# Patient Record
Sex: MALE | Race: WHITE
[De-identification: names, ages, dates, MRNs, and addresses within clinical notes are randomized per-mention and may not be internally consistent; named-entity substitution may affect disease eponyms.]

---

## 2019-06-19 ENCOUNTER — HOSPITAL ENCOUNTER (OUTPATIENT)
Dept: HOSPITAL 8 - CFH | Age: 59
Discharge: HOME | End: 2019-06-19
Attending: INTERNAL MEDICINE
Payer: MEDICAID

## 2019-06-19 DIAGNOSIS — R16.1: ICD-10-CM

## 2019-06-19 DIAGNOSIS — N28.1: ICD-10-CM

## 2019-06-19 DIAGNOSIS — D18.09: ICD-10-CM

## 2019-06-19 DIAGNOSIS — K82.4: Primary | ICD-10-CM

## 2019-06-19 PROCEDURE — 76700 US EXAM ABDOM COMPLETE: CPT

## 2019-07-26 ENCOUNTER — HOSPITAL ENCOUNTER (OUTPATIENT)
Dept: LAB | Facility: MEDICAL CENTER | Age: 59
End: 2019-07-26
Attending: INTERNAL MEDICINE
Payer: MEDICAID

## 2019-07-26 LAB
ALBUMIN SERPL BCP-MCNC: 4.1 G/DL (ref 3.2–4.9)
ALBUMIN/GLOB SERPL: 0.9 G/DL
ALP SERPL-CCNC: 83 U/L (ref 30–99)
ALT SERPL-CCNC: 29 U/L (ref 2–50)
ANION GAP SERPL CALC-SCNC: 8 MMOL/L (ref 0–11.9)
AST SERPL-CCNC: 35 U/L (ref 12–45)
BILIRUB SERPL-MCNC: 0.5 MG/DL (ref 0.1–1.5)
BUN SERPL-MCNC: 50 MG/DL (ref 8–22)
CALCIUM SERPL-MCNC: 9.8 MG/DL (ref 8.5–10.5)
CHLORIDE SERPL-SCNC: 98 MMOL/L (ref 96–112)
CO2 SERPL-SCNC: 28 MMOL/L (ref 20–33)
CREAT SERPL-MCNC: 2.06 MG/DL (ref 0.5–1.4)
GLOBULIN SER CALC-MCNC: 4.8 G/DL (ref 1.9–3.5)
GLUCOSE SERPL-MCNC: 100 MG/DL (ref 65–99)
POTASSIUM SERPL-SCNC: 5.1 MMOL/L (ref 3.6–5.5)
PROT SERPL-MCNC: 8.9 G/DL (ref 6–8.2)
SODIUM SERPL-SCNC: 134 MMOL/L (ref 135–145)

## 2019-07-26 PROCEDURE — 36415 COLL VENOUS BLD VENIPUNCTURE: CPT

## 2019-07-26 PROCEDURE — 80053 COMPREHEN METABOLIC PANEL: CPT

## 2019-07-26 PROCEDURE — 87902 NFCT AGT GNTYP ALYS HEP C: CPT

## 2019-07-30 LAB — HCV GENTYP SERPL NAA+PROBE: NORMAL

## 2020-03-12 ENCOUNTER — HOSPITAL ENCOUNTER (OUTPATIENT)
Dept: LAB | Facility: MEDICAL CENTER | Age: 60
End: 2020-03-12
Attending: INTERNAL MEDICINE
Payer: MEDICAID

## 2020-03-12 LAB
BASOPHILS # BLD AUTO: 0.6 % (ref 0–1.8)
BASOPHILS # BLD: 0.04 K/UL (ref 0–0.12)
C3 SERPL-MCNC: 146 MG/DL (ref 87–200)
C4 SERPL-MCNC: 28 MG/DL (ref 19–52)
EOSINOPHIL # BLD AUTO: 0.17 K/UL (ref 0–0.51)
EOSINOPHIL NFR BLD: 2.3 % (ref 0–6.9)
ERYTHROCYTE [DISTWIDTH] IN BLOOD BY AUTOMATED COUNT: 42.9 FL (ref 35.9–50)
ERYTHROCYTE [SEDIMENTATION RATE] IN BLOOD BY WESTERGREN METHOD: 58 MM/HOUR (ref 0–20)
HCT VFR BLD AUTO: 38.1 % (ref 42–52)
HGB BLD-MCNC: 13.4 G/DL (ref 14–18)
IMM GRANULOCYTES # BLD AUTO: 0.03 K/UL (ref 0–0.11)
IMM GRANULOCYTES NFR BLD AUTO: 0.4 % (ref 0–0.9)
LYMPHOCYTES # BLD AUTO: 1.87 K/UL (ref 1–4.8)
LYMPHOCYTES NFR BLD: 25.8 % (ref 22–41)
MCH RBC QN AUTO: 30.7 PG (ref 27–33)
MCHC RBC AUTO-ENTMCNC: 35.2 G/DL (ref 33.7–35.3)
MCV RBC AUTO: 87.2 FL (ref 81.4–97.8)
MONOCYTES # BLD AUTO: 0.82 K/UL (ref 0–0.85)
MONOCYTES NFR BLD AUTO: 11.3 % (ref 0–13.4)
NEUTROPHILS # BLD AUTO: 4.31 K/UL (ref 1.82–7.42)
NEUTROPHILS NFR BLD: 59.6 % (ref 44–72)
NRBC # BLD AUTO: 0 K/UL
NRBC BLD-RTO: 0 /100 WBC
PLATELET # BLD AUTO: 260 K/UL (ref 164–446)
PMV BLD AUTO: 9.4 FL (ref 9–12.9)
RBC # BLD AUTO: 4.37 M/UL (ref 4.7–6.1)
WBC # BLD AUTO: 7.2 K/UL (ref 4.8–10.8)

## 2020-03-12 PROCEDURE — 83970 ASSAY OF PARATHORMONE: CPT

## 2020-03-12 PROCEDURE — 84550 ASSAY OF BLOOD/URIC ACID: CPT

## 2020-03-12 PROCEDURE — 83550 IRON BINDING TEST: CPT

## 2020-03-12 PROCEDURE — 86162 COMPLEMENT TOTAL (CH50): CPT

## 2020-03-12 PROCEDURE — 83036 HEMOGLOBIN GLYCOSYLATED A1C: CPT

## 2020-03-12 PROCEDURE — 83540 ASSAY OF IRON: CPT

## 2020-03-12 PROCEDURE — 85025 COMPLETE CBC W/AUTO DIFF WBC: CPT

## 2020-03-12 PROCEDURE — 82728 ASSAY OF FERRITIN: CPT

## 2020-03-12 PROCEDURE — 82306 VITAMIN D 25 HYDROXY: CPT

## 2020-03-12 PROCEDURE — 80069 RENAL FUNCTION PANEL: CPT

## 2020-03-12 PROCEDURE — 86160 COMPLEMENT ANTIGEN: CPT

## 2020-03-12 PROCEDURE — 85652 RBC SED RATE AUTOMATED: CPT

## 2020-03-12 PROCEDURE — 36415 COLL VENOUS BLD VENIPUNCTURE: CPT

## 2020-03-13 LAB
25(OH)D3 SERPL-MCNC: 63 NG/ML (ref 30–100)
ALBUMIN SERPL BCP-MCNC: 3.8 G/DL (ref 3.2–4.9)
BUN SERPL-MCNC: 50 MG/DL (ref 8–22)
CALCIUM SERPL-MCNC: 9.8 MG/DL (ref 8.5–10.5)
CHLORIDE SERPL-SCNC: 102 MMOL/L (ref 96–112)
CO2 SERPL-SCNC: 22 MMOL/L (ref 20–33)
CREAT SERPL-MCNC: 1.84 MG/DL (ref 0.5–1.4)
EST. AVERAGE GLUCOSE BLD GHB EST-MCNC: 126 MG/DL
FERRITIN SERPL-MCNC: 88.7 NG/ML (ref 22–322)
GLUCOSE SERPL-MCNC: 108 MG/DL (ref 65–99)
HBA1C MFR BLD: 6 % (ref 0–5.6)
IRON SATN MFR SERPL: 15 % (ref 15–55)
IRON SERPL-MCNC: 61 UG/DL (ref 50–180)
PHOSPHATE SERPL-MCNC: 4.5 MG/DL (ref 2.5–4.5)
POTASSIUM SERPL-SCNC: 4.7 MMOL/L (ref 3.6–5.5)
PTH-INTACT SERPL-MCNC: 87.7 PG/ML (ref 14–72)
SODIUM SERPL-SCNC: 135 MMOL/L (ref 135–145)
TIBC SERPL-MCNC: 413 UG/DL (ref 250–450)
URATE SERPL-MCNC: 8.9 MG/DL (ref 2.5–8.3)

## 2020-03-14 LAB — CH50 SERPL-ACNC: 135 CAE UNITS (ref 60–144)

## 2020-04-20 ENCOUNTER — HOSPITAL ENCOUNTER (OUTPATIENT)
Dept: LAB | Facility: MEDICAL CENTER | Age: 60
End: 2020-04-20
Attending: PHYSICIAN ASSISTANT
Payer: MEDICAID

## 2020-04-20 LAB
ALBUMIN SERPL BCP-MCNC: 4 G/DL (ref 3.2–4.9)
ALP SERPL-CCNC: 88 U/L (ref 30–99)
ALT SERPL-CCNC: 28 U/L (ref 2–50)
AST SERPL-CCNC: 37 U/L (ref 12–45)
BILIRUB CONJ SERPL-MCNC: <0.2 MG/DL (ref 0.1–0.5)
BILIRUB INDIRECT SERPL-MCNC: ABNORMAL MG/DL (ref 0–1)
BILIRUB SERPL-MCNC: 0.5 MG/DL (ref 0.1–1.5)
HBV CORE AB SERPL QL IA: NONREACTIVE
HBV SURFACE AB SERPL IA-ACNC: 4.41 MIU/ML (ref 0–10)
HBV SURFACE AG SER QL: NORMAL
PLATELET # BLD AUTO: 192 K/UL (ref 164–446)
PROT SERPL-MCNC: 8.3 G/DL (ref 6–8.2)

## 2020-04-20 PROCEDURE — 82977 ASSAY OF GGT: CPT

## 2020-04-20 PROCEDURE — 86704 HEP B CORE ANTIBODY TOTAL: CPT

## 2020-04-20 PROCEDURE — 86706 HEP B SURFACE ANTIBODY: CPT

## 2020-04-20 PROCEDURE — 84460 ALANINE AMINO (ALT) (SGPT): CPT | Mod: XU

## 2020-04-20 PROCEDURE — 85049 AUTOMATED PLATELET COUNT: CPT

## 2020-04-20 PROCEDURE — 87340 HEPATITIS B SURFACE AG IA: CPT

## 2020-04-20 PROCEDURE — 84450 TRANSFERASE (AST) (SGOT): CPT | Mod: XU

## 2020-04-20 PROCEDURE — 87522 HEPATITIS C REVRS TRNSCRPJ: CPT

## 2020-04-20 PROCEDURE — 84520 ASSAY OF UREA NITROGEN: CPT

## 2020-04-20 PROCEDURE — 80076 HEPATIC FUNCTION PANEL: CPT

## 2020-04-20 PROCEDURE — 36415 COLL VENOUS BLD VENIPUNCTURE: CPT

## 2020-04-20 PROCEDURE — 86708 HEPATITIS A ANTIBODY: CPT

## 2020-04-20 PROCEDURE — 83883 ASSAY NEPHELOMETRY NOT SPEC: CPT

## 2020-04-22 LAB — HAV AB SER QL IA: POSITIVE

## 2020-04-23 LAB
HCV RNA SERPL NAA+PROBE-ACNC: ABNORMAL K[IU]/ML
HCV RNA SERPL NAA+PROBE-LOG IU: 4.99 LOG IU/ML
HCV RNA SERPL QL NAA+PROBE: DETECTED

## 2020-04-24 LAB
A2 MACROGLOB SERPL-MCNC: 278 MG/DL (ref 131–293)
ALT SERPL-CCNC: 33 U/L (ref 5–50)
ANNOTATION COMMENT IMP: ABNORMAL
AST SERPL-CCNC: 41 U/L (ref 9–50)
BUN SERPL-SCNC: 63 MG/DL (ref 7–20)
CIRRHOMETER PT SCORE Q4850: 0.11
FIBROSIS STAGE SERPL QL: ABNORMAL
GGT SERPL-CCNC: 20 U/L (ref 7–51)
INFLAMETER META CLASS Q4853: ABNORMAL
INFLAMETER PT SCORE Q4852: 0.44
LIVER FIBR SCORE SERPL CALC.FIBROMETER: 0.68
PATHOLOGY STUDY: ABNORMAL
PROTHROM ACT/NOR PPP: 90 % (ref 90–120)

## 2020-07-10 ENCOUNTER — HOSPITAL ENCOUNTER (OUTPATIENT)
Dept: LAB | Facility: MEDICAL CENTER | Age: 60
End: 2020-07-10
Attending: INTERNAL MEDICINE
Payer: MEDICAID

## 2020-07-10 LAB
ALBUMIN SERPL BCP-MCNC: 4 G/DL (ref 3.2–4.9)
ANION GAP SERPL CALC-SCNC: 15 MMOL/L (ref 7–16)
APPEARANCE UR: CLEAR
BASOPHILS # BLD AUTO: 0.8 % (ref 0–1.8)
BASOPHILS # BLD: 0.05 K/UL (ref 0–0.12)
BILIRUB UR QL STRIP.AUTO: NEGATIVE
BUN SERPL-MCNC: 41 MG/DL (ref 8–22)
BUN SERPL-MCNC: 41 MG/DL (ref 8–22)
CALCIUM SERPL-MCNC: 9.7 MG/DL (ref 8.5–10.5)
CALCIUM SERPL-MCNC: 9.8 MG/DL (ref 8.5–10.5)
CHLORIDE SERPL-SCNC: 95 MMOL/L (ref 96–112)
CHLORIDE SERPL-SCNC: 96 MMOL/L (ref 96–112)
CO2 SERPL-SCNC: 20 MMOL/L (ref 20–33)
CO2 SERPL-SCNC: 23 MMOL/L (ref 20–33)
COLOR UR: YELLOW
CREAT SERPL-MCNC: 1.82 MG/DL (ref 0.5–1.4)
CREAT SERPL-MCNC: 1.83 MG/DL (ref 0.5–1.4)
CREAT UR-MCNC: 121.5 MG/DL
CREAT UR-MCNC: 122.78 MG/DL
EOSINOPHIL # BLD AUTO: 0.15 K/UL (ref 0–0.51)
EOSINOPHIL NFR BLD: 2.3 % (ref 0–6.9)
ERYTHROCYTE [DISTWIDTH] IN BLOOD BY AUTOMATED COUNT: 45.1 FL (ref 35.9–50)
GLUCOSE SERPL-MCNC: 100 MG/DL (ref 65–99)
GLUCOSE SERPL-MCNC: 98 MG/DL (ref 65–99)
GLUCOSE UR STRIP.AUTO-MCNC: NEGATIVE MG/DL
HCT VFR BLD AUTO: 43.7 % (ref 42–52)
HGB BLD-MCNC: 14.3 G/DL (ref 14–18)
IMM GRANULOCYTES # BLD AUTO: 0.02 K/UL (ref 0–0.11)
IMM GRANULOCYTES NFR BLD AUTO: 0.3 % (ref 0–0.9)
KETONES UR STRIP.AUTO-MCNC: NEGATIVE MG/DL
LEUKOCYTE ESTERASE UR QL STRIP.AUTO: NEGATIVE
LYMPHOCYTES # BLD AUTO: 1.62 K/UL (ref 1–4.8)
LYMPHOCYTES NFR BLD: 25 % (ref 22–41)
MCH RBC QN AUTO: 30 PG (ref 27–33)
MCHC RBC AUTO-ENTMCNC: 32.7 G/DL (ref 33.7–35.3)
MCV RBC AUTO: 91.8 FL (ref 81.4–97.8)
MICRO URNS: NORMAL
MICROALBUMIN UR-MCNC: <1.2 MG/DL
MICROALBUMIN/CREAT UR: NORMAL MG/G (ref 0–30)
MONOCYTES # BLD AUTO: 0.54 K/UL (ref 0–0.85)
MONOCYTES NFR BLD AUTO: 8.3 % (ref 0–13.4)
NEUTROPHILS # BLD AUTO: 4.1 K/UL (ref 1.82–7.42)
NEUTROPHILS NFR BLD: 63.3 % (ref 44–72)
NITRITE UR QL STRIP.AUTO: NEGATIVE
NRBC # BLD AUTO: 0 K/UL
NRBC BLD-RTO: 0 /100 WBC
PH UR STRIP.AUTO: 6 [PH] (ref 5–8)
PHOSPHATE SERPL-MCNC: 3.5 MG/DL (ref 2.5–4.5)
PLATELET # BLD AUTO: 219 K/UL (ref 164–446)
PMV BLD AUTO: 9.2 FL (ref 9–12.9)
POTASSIUM SERPL-SCNC: 4.3 MMOL/L (ref 3.6–5.5)
POTASSIUM SERPL-SCNC: 4.4 MMOL/L (ref 3.6–5.5)
PROT UR QL STRIP: NEGATIVE MG/DL
PROT UR-MCNC: 8 MG/DL (ref 0–15)
PROT/CREAT UR: 66 MG/G (ref 15–68)
RBC # BLD AUTO: 4.76 M/UL (ref 4.7–6.1)
RBC UR QL AUTO: NEGATIVE
SODIUM SERPL-SCNC: 131 MMOL/L (ref 135–145)
SODIUM SERPL-SCNC: 132 MMOL/L (ref 135–145)
SP GR UR STRIP.AUTO: 1.02
URATE SERPL-MCNC: 9.1 MG/DL (ref 2.5–8.3)
UROBILINOGEN UR STRIP.AUTO-MCNC: 1 MG/DL
WBC # BLD AUTO: 6.5 K/UL (ref 4.8–10.8)

## 2020-07-10 PROCEDURE — 81003 URINALYSIS AUTO W/O SCOPE: CPT

## 2020-07-10 PROCEDURE — 82043 UR ALBUMIN QUANTITATIVE: CPT

## 2020-07-10 PROCEDURE — 85025 COMPLETE CBC W/AUTO DIFF WBC: CPT

## 2020-07-10 PROCEDURE — 36415 COLL VENOUS BLD VENIPUNCTURE: CPT

## 2020-07-10 PROCEDURE — 82570 ASSAY OF URINE CREATININE: CPT | Mod: 91

## 2020-07-10 PROCEDURE — 83520 IMMUNOASSAY QUANT NOS NONAB: CPT | Mod: 91

## 2020-07-10 PROCEDURE — 80069 RENAL FUNCTION PANEL: CPT

## 2020-07-10 PROCEDURE — 84156 ASSAY OF PROTEIN URINE: CPT

## 2020-07-10 PROCEDURE — 80048 BASIC METABOLIC PNL TOTAL CA: CPT

## 2020-07-10 PROCEDURE — 84550 ASSAY OF BLOOD/URIC ACID: CPT

## 2020-07-14 LAB
KAPPA LC FREE SER-MCNC: 54 MG/L (ref 3.3–19.4)
KAPPA LC FREE/LAMBDA FREE SER NEPH: 1.27 {RATIO} (ref 0.26–1.65)
LAMBDA LC FREE SERPL-MCNC: 42.45 MG/L (ref 5.71–26.3)

## 2020-08-11 ENCOUNTER — HOSPITAL ENCOUNTER (OUTPATIENT)
Dept: LAB | Facility: MEDICAL CENTER | Age: 60
End: 2020-08-11
Attending: PHYSICIAN ASSISTANT
Payer: MEDICAID

## 2020-08-11 LAB
ALBUMIN SERPL BCP-MCNC: 4 G/DL (ref 3.2–4.9)
ALBUMIN/GLOB SERPL: 1.1 G/DL
ALP SERPL-CCNC: 105 U/L (ref 30–99)
ALT SERPL-CCNC: 10 U/L (ref 2–50)
ANION GAP SERPL CALC-SCNC: 14 MMOL/L (ref 7–16)
AST SERPL-CCNC: 20 U/L (ref 12–45)
BILIRUB SERPL-MCNC: 0.6 MG/DL (ref 0.1–1.5)
BUN SERPL-MCNC: 44 MG/DL (ref 8–22)
CALCIUM SERPL-MCNC: 9.8 MG/DL (ref 8.5–10.5)
CHLORIDE SERPL-SCNC: 95 MMOL/L (ref 96–112)
CO2 SERPL-SCNC: 22 MMOL/L (ref 20–33)
CREAT SERPL-MCNC: 1.9 MG/DL (ref 0.5–1.4)
GLOBULIN SER CALC-MCNC: 3.8 G/DL (ref 1.9–3.5)
GLUCOSE SERPL-MCNC: 99 MG/DL (ref 65–99)
POTASSIUM SERPL-SCNC: 5.1 MMOL/L (ref 3.6–5.5)
PROT SERPL-MCNC: 7.8 G/DL (ref 6–8.2)
SODIUM SERPL-SCNC: 131 MMOL/L (ref 135–145)

## 2020-08-11 PROCEDURE — 36415 COLL VENOUS BLD VENIPUNCTURE: CPT

## 2020-08-11 PROCEDURE — 80053 COMPREHEN METABOLIC PANEL: CPT

## 2020-08-11 PROCEDURE — 87522 HEPATITIS C REVRS TRNSCRPJ: CPT

## 2020-08-14 LAB
HCV RNA SERPL NAA+PROBE-ACNC: NOT DETECTED IU/ML
HCV RNA SERPL NAA+PROBE-LOG IU: NOT DETECTED LOG IU/ML
HCV RNA SERPL QL NAA+PROBE: NOT DETECTED

## 2021-07-16 ENCOUNTER — HOSPITAL ENCOUNTER (OUTPATIENT)
Dept: LAB | Facility: MEDICAL CENTER | Age: 61
End: 2021-07-16
Attending: FAMILY MEDICINE
Payer: MEDICAID

## 2021-07-16 LAB
ALBUMIN SERPL BCP-MCNC: 4.1 G/DL (ref 3.2–4.9)
ALBUMIN/GLOB SERPL: 1 G/DL
ALP SERPL-CCNC: 101 U/L (ref 30–99)
ALT SERPL-CCNC: 5 U/L (ref 2–50)
ANION GAP SERPL CALC-SCNC: 14 MMOL/L (ref 7–16)
AST SERPL-CCNC: 17 U/L (ref 12–45)
BASOPHILS # BLD AUTO: 0.9 % (ref 0–1.8)
BASOPHILS # BLD: 0.06 K/UL (ref 0–0.12)
BILIRUB SERPL-MCNC: 0.3 MG/DL (ref 0.1–1.5)
BUN SERPL-MCNC: 35 MG/DL (ref 8–22)
CALCIUM SERPL-MCNC: 9.7 MG/DL (ref 8.5–10.5)
CHLORIDE SERPL-SCNC: 97 MMOL/L (ref 96–112)
CHOLEST SERPL-MCNC: 163 MG/DL (ref 100–199)
CO2 SERPL-SCNC: 24 MMOL/L (ref 20–33)
CREAT SERPL-MCNC: 2.15 MG/DL (ref 0.5–1.4)
EOSINOPHIL # BLD AUTO: 0.19 K/UL (ref 0–0.51)
EOSINOPHIL NFR BLD: 3 % (ref 0–6.9)
ERYTHROCYTE [DISTWIDTH] IN BLOOD BY AUTOMATED COUNT: 45.2 FL (ref 35.9–50)
GLOBULIN SER CALC-MCNC: 4 G/DL (ref 1.9–3.5)
GLUCOSE SERPL-MCNC: 96 MG/DL (ref 65–99)
HCT VFR BLD AUTO: 41.8 % (ref 42–52)
HCV AB SER QL: REACTIVE
HDLC SERPL-MCNC: 35 MG/DL
HGB BLD-MCNC: 14 G/DL (ref 14–18)
IMM GRANULOCYTES # BLD AUTO: 0.02 K/UL (ref 0–0.11)
IMM GRANULOCYTES NFR BLD AUTO: 0.3 % (ref 0–0.9)
LDLC SERPL CALC-MCNC: 87 MG/DL
LYMPHOCYTES # BLD AUTO: 1.92 K/UL (ref 1–4.8)
LYMPHOCYTES NFR BLD: 30.3 % (ref 22–41)
MCH RBC QN AUTO: 30 PG (ref 27–33)
MCHC RBC AUTO-ENTMCNC: 33.5 G/DL (ref 33.7–35.3)
MCV RBC AUTO: 89.5 FL (ref 81.4–97.8)
MONOCYTES # BLD AUTO: 0.48 K/UL (ref 0–0.85)
MONOCYTES NFR BLD AUTO: 7.6 % (ref 0–13.4)
NEUTROPHILS # BLD AUTO: 3.66 K/UL (ref 1.82–7.42)
NEUTROPHILS NFR BLD: 57.9 % (ref 44–72)
NRBC # BLD AUTO: 0 K/UL
NRBC BLD-RTO: 0 /100 WBC
PLATELET # BLD AUTO: 213 K/UL (ref 164–446)
PMV BLD AUTO: 10.2 FL (ref 9–12.9)
POTASSIUM SERPL-SCNC: 4.5 MMOL/L (ref 3.6–5.5)
PROT SERPL-MCNC: 8.1 G/DL (ref 6–8.2)
RBC # BLD AUTO: 4.67 M/UL (ref 4.7–6.1)
SODIUM SERPL-SCNC: 135 MMOL/L (ref 135–145)
TRIGL SERPL-MCNC: 205 MG/DL (ref 0–149)
WBC # BLD AUTO: 6.3 K/UL (ref 4.8–10.8)

## 2021-07-16 PROCEDURE — 85025 COMPLETE CBC W/AUTO DIFF WBC: CPT

## 2021-07-16 PROCEDURE — 86803 HEPATITIS C AB TEST: CPT

## 2021-07-16 PROCEDURE — 87522 HEPATITIS C REVRS TRNSCRPJ: CPT

## 2021-07-16 PROCEDURE — 80053 COMPREHEN METABOLIC PANEL: CPT

## 2021-07-16 PROCEDURE — 36415 COLL VENOUS BLD VENIPUNCTURE: CPT

## 2021-07-16 PROCEDURE — 80061 LIPID PANEL: CPT

## 2023-10-27 ENCOUNTER — APPOINTMENT (OUTPATIENT)
Dept: RADIOLOGY | Facility: MEDICAL CENTER | Age: 63
DRG: 683 | End: 2023-10-27
Attending: EMERGENCY MEDICINE
Payer: MEDICAID

## 2023-10-27 ENCOUNTER — HOSPITAL ENCOUNTER (INPATIENT)
Facility: MEDICAL CENTER | Age: 63
LOS: 2 days | DRG: 683 | End: 2023-10-29
Attending: EMERGENCY MEDICINE | Admitting: STUDENT IN AN ORGANIZED HEALTH CARE EDUCATION/TRAINING PROGRAM
Payer: MEDICAID

## 2023-10-27 ENCOUNTER — APPOINTMENT (OUTPATIENT)
Dept: CARDIOLOGY | Facility: MEDICAL CENTER | Age: 63
DRG: 683 | End: 2023-10-27
Attending: STUDENT IN AN ORGANIZED HEALTH CARE EDUCATION/TRAINING PROGRAM
Payer: MEDICAID

## 2023-10-27 DIAGNOSIS — R07.89 CHEST WALL PAIN: ICD-10-CM

## 2023-10-27 DIAGNOSIS — E87.5 HYPERKALEMIA: ICD-10-CM

## 2023-10-27 DIAGNOSIS — Z51.89 VISIT FOR WOUND CHECK: ICD-10-CM

## 2023-10-27 DIAGNOSIS — F11.20 HEROIN DEPENDENCE (HCC): ICD-10-CM

## 2023-10-27 DIAGNOSIS — N17.9 ACUTE RENAL FAILURE SUPERIMPOSED ON CHRONIC KIDNEY DISEASE, UNSPECIFIED ACUTE RENAL FAILURE TYPE, UNSPECIFIED CKD STAGE (HCC): ICD-10-CM

## 2023-10-27 DIAGNOSIS — N18.9 ACUTE RENAL FAILURE SUPERIMPOSED ON CHRONIC KIDNEY DISEASE, UNSPECIFIED ACUTE RENAL FAILURE TYPE, UNSPECIFIED CKD STAGE (HCC): ICD-10-CM

## 2023-10-27 PROBLEM — I50.20 HFREF (HEART FAILURE WITH REDUCED EJECTION FRACTION) (HCC): Status: ACTIVE | Noted: 2023-10-27

## 2023-10-27 PROBLEM — R07.9 CHEST PAIN: Status: ACTIVE | Noted: 2023-10-27

## 2023-10-27 PROBLEM — F17.210 DEPENDENCE ON NICOTINE FROM CIGARETTES: Status: ACTIVE | Noted: 2023-10-27

## 2023-10-27 LAB
ALBUMIN SERPL BCP-MCNC: 4.1 G/DL (ref 3.2–4.9)
ALBUMIN SERPL BCP-MCNC: 4.5 G/DL (ref 3.2–4.9)
ALBUMIN/GLOB SERPL: 1.1 G/DL
ALBUMIN/GLOB SERPL: 1.1 G/DL
ALP SERPL-CCNC: 85 U/L (ref 30–99)
ALP SERPL-CCNC: 98 U/L (ref 30–99)
ALT SERPL-CCNC: 7 U/L (ref 2–50)
ALT SERPL-CCNC: 7 U/L (ref 2–50)
ANION GAP SERPL CALC-SCNC: 13 MMOL/L (ref 7–16)
ANION GAP SERPL CALC-SCNC: 15 MMOL/L (ref 7–16)
ANION GAP SERPL CALC-SCNC: 15 MMOL/L (ref 7–16)
ANION GAP SERPL CALC-SCNC: 16 MMOL/L (ref 7–16)
APPEARANCE UR: CLEAR
AST SERPL-CCNC: 11 U/L (ref 12–45)
AST SERPL-CCNC: 15 U/L (ref 12–45)
BASOPHILS # BLD AUTO: 0.6 % (ref 0–1.8)
BASOPHILS # BLD: 0.05 K/UL (ref 0–0.12)
BILIRUB SERPL-MCNC: 0.2 MG/DL (ref 0.1–1.5)
BILIRUB SERPL-MCNC: 0.3 MG/DL (ref 0.1–1.5)
BILIRUB UR QL STRIP.AUTO: NEGATIVE
BUN SERPL-MCNC: 106 MG/DL (ref 8–22)
BUN SERPL-MCNC: 110 MG/DL (ref 8–22)
BUN SERPL-MCNC: 111 MG/DL (ref 8–22)
BUN SERPL-MCNC: 91 MG/DL (ref 8–22)
CALCIUM ALBUM COR SERPL-MCNC: 10 MG/DL (ref 8.5–10.5)
CALCIUM ALBUM COR SERPL-MCNC: 9.8 MG/DL (ref 8.5–10.5)
CALCIUM SERPL-MCNC: 10.1 MG/DL (ref 8.5–10.5)
CALCIUM SERPL-MCNC: 10.2 MG/DL (ref 8.5–10.5)
CALCIUM SERPL-MCNC: 9.8 MG/DL (ref 8.5–10.5)
CALCIUM SERPL-MCNC: 9.9 MG/DL (ref 8.5–10.5)
CHLORIDE SERPL-SCNC: 97 MMOL/L (ref 96–112)
CHLORIDE SERPL-SCNC: 98 MMOL/L (ref 96–112)
CHLORIDE SERPL-SCNC: 98 MMOL/L (ref 96–112)
CHLORIDE SERPL-SCNC: 99 MMOL/L (ref 96–112)
CO2 SERPL-SCNC: 18 MMOL/L (ref 20–33)
CO2 SERPL-SCNC: 19 MMOL/L (ref 20–33)
CO2 SERPL-SCNC: 20 MMOL/L (ref 20–33)
CO2 SERPL-SCNC: 22 MMOL/L (ref 20–33)
COLOR UR: YELLOW
CREAT SERPL-MCNC: 2.99 MG/DL (ref 0.5–1.4)
CREAT SERPL-MCNC: 3.41 MG/DL (ref 0.5–1.4)
CREAT SERPL-MCNC: 3.42 MG/DL (ref 0.5–1.4)
CREAT SERPL-MCNC: 3.61 MG/DL (ref 0.5–1.4)
CREAT UR-MCNC: 21.31 MG/DL
EKG IMPRESSION: NORMAL
EOSINOPHIL # BLD AUTO: 0.23 K/UL (ref 0–0.51)
EOSINOPHIL NFR BLD: 2.9 % (ref 0–6.9)
ERYTHROCYTE [DISTWIDTH] IN BLOOD BY AUTOMATED COUNT: 41.7 FL (ref 35.9–50)
GFR SERPLBLD CREATININE-BSD FMLA CKD-EPI: 18 ML/MIN/1.73 M 2
GFR SERPLBLD CREATININE-BSD FMLA CKD-EPI: 19 ML/MIN/1.73 M 2
GFR SERPLBLD CREATININE-BSD FMLA CKD-EPI: 20 ML/MIN/1.73 M 2
GFR SERPLBLD CREATININE-BSD FMLA CKD-EPI: 23 ML/MIN/1.73 M 2
GLOBULIN SER CALC-MCNC: 3.8 G/DL (ref 1.9–3.5)
GLOBULIN SER CALC-MCNC: 4.1 G/DL (ref 1.9–3.5)
GLUCOSE BLD STRIP.AUTO-MCNC: 104 MG/DL (ref 65–99)
GLUCOSE SERPL-MCNC: 109 MG/DL (ref 65–99)
GLUCOSE SERPL-MCNC: 115 MG/DL (ref 65–99)
GLUCOSE SERPL-MCNC: 126 MG/DL (ref 65–99)
GLUCOSE SERPL-MCNC: 140 MG/DL (ref 65–99)
GLUCOSE UR STRIP.AUTO-MCNC: NEGATIVE MG/DL
HCT VFR BLD AUTO: 44.6 % (ref 42–52)
HGB BLD-MCNC: 15.4 G/DL (ref 14–18)
IMM GRANULOCYTES # BLD AUTO: 0.03 K/UL (ref 0–0.11)
IMM GRANULOCYTES NFR BLD AUTO: 0.4 % (ref 0–0.9)
KETONES UR STRIP.AUTO-MCNC: NEGATIVE MG/DL
LEUKOCYTE ESTERASE UR QL STRIP.AUTO: NEGATIVE
LV EJECT FRACT  99904: 65
LV EJECT FRACT MOD 2C 99903: 53.97
LV EJECT FRACT MOD 4C 99902: 66.36
LV EJECT FRACT MOD BP 99901: 62.28
LYMPHOCYTES # BLD AUTO: 1.89 K/UL (ref 1–4.8)
LYMPHOCYTES NFR BLD: 23.8 % (ref 22–41)
MAGNESIUM SERPL-MCNC: 2.4 MG/DL (ref 1.5–2.5)
MAGNESIUM SERPL-MCNC: 2.6 MG/DL (ref 1.5–2.5)
MCH RBC QN AUTO: 29.8 PG (ref 27–33)
MCHC RBC AUTO-ENTMCNC: 34.5 G/DL (ref 32.3–36.5)
MCV RBC AUTO: 86.4 FL (ref 81.4–97.8)
MICRO URNS: NORMAL
MONOCYTES # BLD AUTO: 0.68 K/UL (ref 0–0.85)
MONOCYTES NFR BLD AUTO: 8.6 % (ref 0–13.4)
NEUTROPHILS # BLD AUTO: 5.05 K/UL (ref 1.82–7.42)
NEUTROPHILS NFR BLD: 63.7 % (ref 44–72)
NITRITE UR QL STRIP.AUTO: NEGATIVE
NRBC # BLD AUTO: 0 K/UL
NRBC BLD-RTO: 0 /100 WBC (ref 0–0.2)
PH UR STRIP.AUTO: 6 [PH] (ref 5–8)
PHOSPHATE SERPL-MCNC: 5.3 MG/DL (ref 2.5–4.5)
PHOSPHATE SERPL-MCNC: 5.8 MG/DL (ref 2.5–4.5)
PLATELET # BLD AUTO: 245 K/UL (ref 164–446)
PMV BLD AUTO: 9.5 FL (ref 9–12.9)
POTASSIUM SERPL-SCNC: 5 MMOL/L (ref 3.6–5.5)
POTASSIUM SERPL-SCNC: 5.6 MMOL/L (ref 3.6–5.5)
POTASSIUM SERPL-SCNC: 6.1 MMOL/L (ref 3.6–5.5)
POTASSIUM SERPL-SCNC: 6.7 MMOL/L (ref 3.6–5.5)
POTASSIUM UR-SCNC: 20 MMOL/L
PROT SERPL-MCNC: 7.9 G/DL (ref 6–8.2)
PROT SERPL-MCNC: 8.6 G/DL (ref 6–8.2)
PROT UR QL STRIP: NEGATIVE MG/DL
PROT UR-MCNC: 8 MG/DL (ref 0–15)
RBC # BLD AUTO: 5.16 M/UL (ref 4.7–6.1)
RBC UR QL AUTO: NEGATIVE
SODIUM SERPL-SCNC: 131 MMOL/L (ref 135–145)
SODIUM SERPL-SCNC: 132 MMOL/L (ref 135–145)
SODIUM SERPL-SCNC: 133 MMOL/L (ref 135–145)
SODIUM SERPL-SCNC: 134 MMOL/L (ref 135–145)
SODIUM UR-SCNC: 97 MMOL/L
SP GR UR STRIP.AUTO: 1.01
TROPONIN T SERPL-MCNC: 45 NG/L (ref 6–19)
TROPONIN T SERPL-MCNC: 46 NG/L (ref 6–19)
UROBILINOGEN UR STRIP.AUTO-MCNC: 0.2 MG/DL
UUN UR-MCNC: 316 MG/DL
WBC # BLD AUTO: 7.9 K/UL (ref 4.8–10.8)

## 2023-10-27 PROCEDURE — 700102 HCHG RX REV CODE 250 W/ 637 OVERRIDE(OP): Performed by: STUDENT IN AN ORGANIZED HEALTH CARE EDUCATION/TRAINING PROGRAM

## 2023-10-27 PROCEDURE — 83735 ASSAY OF MAGNESIUM: CPT

## 2023-10-27 PROCEDURE — 93005 ELECTROCARDIOGRAM TRACING: CPT

## 2023-10-27 PROCEDURE — 700105 HCHG RX REV CODE 258: Mod: UD | Performed by: EMERGENCY MEDICINE

## 2023-10-27 PROCEDURE — 700105 HCHG RX REV CODE 258: Performed by: STUDENT IN AN ORGANIZED HEALTH CARE EDUCATION/TRAINING PROGRAM

## 2023-10-27 PROCEDURE — 84300 ASSAY OF URINE SODIUM: CPT

## 2023-10-27 PROCEDURE — C9399 UNCLASSIFIED DRUGS OR BIOLOG: HCPCS | Performed by: STUDENT IN AN ORGANIZED HEALTH CARE EDUCATION/TRAINING PROGRAM

## 2023-10-27 PROCEDURE — A9270 NON-COVERED ITEM OR SERVICE: HCPCS | Performed by: STUDENT IN AN ORGANIZED HEALTH CARE EDUCATION/TRAINING PROGRAM

## 2023-10-27 PROCEDURE — 93306 TTE W/DOPPLER COMPLETE: CPT

## 2023-10-27 PROCEDURE — 96367 TX/PROPH/DG ADDL SEQ IV INF: CPT

## 2023-10-27 PROCEDURE — 84133 ASSAY OF URINE POTASSIUM: CPT

## 2023-10-27 PROCEDURE — 80048 BASIC METABOLIC PNL TOTAL CA: CPT

## 2023-10-27 PROCEDURE — 84484 ASSAY OF TROPONIN QUANT: CPT

## 2023-10-27 PROCEDURE — 84540 ASSAY OF URINE/UREA-N: CPT

## 2023-10-27 PROCEDURE — 93005 ELECTROCARDIOGRAM TRACING: CPT | Performed by: EMERGENCY MEDICINE

## 2023-10-27 PROCEDURE — 99285 EMERGENCY DEPT VISIT HI MDM: CPT

## 2023-10-27 PROCEDURE — 700111 HCHG RX REV CODE 636 W/ 250 OVERRIDE (IP): Performed by: STUDENT IN AN ORGANIZED HEALTH CARE EDUCATION/TRAINING PROGRAM

## 2023-10-27 PROCEDURE — 82570 ASSAY OF URINE CREATININE: CPT

## 2023-10-27 PROCEDURE — 700111 HCHG RX REV CODE 636 W/ 250 OVERRIDE (IP): Mod: JZ | Performed by: STUDENT IN AN ORGANIZED HEALTH CARE EDUCATION/TRAINING PROGRAM

## 2023-10-27 PROCEDURE — 76775 US EXAM ABDO BACK WALL LIM: CPT

## 2023-10-27 PROCEDURE — 85025 COMPLETE CBC W/AUTO DIFF WBC: CPT

## 2023-10-27 PROCEDURE — 700102 HCHG RX REV CODE 250 W/ 637 OVERRIDE(OP): Mod: UD | Performed by: EMERGENCY MEDICINE

## 2023-10-27 PROCEDURE — 84100 ASSAY OF PHOSPHORUS: CPT

## 2023-10-27 PROCEDURE — 700111 HCHG RX REV CODE 636 W/ 250 OVERRIDE (IP): Performed by: EMERGENCY MEDICINE

## 2023-10-27 PROCEDURE — 96365 THER/PROPH/DIAG IV INF INIT: CPT

## 2023-10-27 PROCEDURE — 99223 1ST HOSP IP/OBS HIGH 75: CPT | Performed by: STUDENT IN AN ORGANIZED HEALTH CARE EDUCATION/TRAINING PROGRAM

## 2023-10-27 PROCEDURE — 36415 COLL VENOUS BLD VENIPUNCTURE: CPT

## 2023-10-27 PROCEDURE — 82962 GLUCOSE BLOOD TEST: CPT

## 2023-10-27 PROCEDURE — 770020 HCHG ROOM/CARE - TELE (206)

## 2023-10-27 PROCEDURE — 80053 COMPREHEN METABOLIC PANEL: CPT

## 2023-10-27 PROCEDURE — 71045 X-RAY EXAM CHEST 1 VIEW: CPT

## 2023-10-27 PROCEDURE — 84156 ASSAY OF PROTEIN URINE: CPT

## 2023-10-27 PROCEDURE — 96375 TX/PRO/DX INJ NEW DRUG ADDON: CPT

## 2023-10-27 PROCEDURE — 93306 TTE W/DOPPLER COMPLETE: CPT | Mod: 26 | Performed by: INTERNAL MEDICINE

## 2023-10-27 PROCEDURE — 81003 URINALYSIS AUTO W/O SCOPE: CPT

## 2023-10-27 RX ORDER — SPIRONOLACTONE 25 MG/1
25 TABLET ORAL DAILY
Status: ON HOLD | COMMUNITY
End: 2023-10-29

## 2023-10-27 RX ORDER — BISACODYL 10 MG
10 SUPPOSITORY, RECTAL RECTAL
Status: DISCONTINUED | OUTPATIENT
Start: 2023-10-27 | End: 2023-10-29 | Stop reason: HOSPADM

## 2023-10-27 RX ORDER — LISINOPRIL 10 MG/1
10 TABLET ORAL DAILY
COMMUNITY

## 2023-10-27 RX ORDER — FUROSEMIDE 10 MG/ML
80 INJECTION INTRAMUSCULAR; INTRAVENOUS ONCE
Status: COMPLETED | OUTPATIENT
Start: 2023-10-27 | End: 2023-10-27

## 2023-10-27 RX ORDER — ONDANSETRON 4 MG/1
4 TABLET, ORALLY DISINTEGRATING ORAL EVERY 4 HOURS PRN
Status: DISCONTINUED | OUTPATIENT
Start: 2023-10-27 | End: 2023-10-29 | Stop reason: HOSPADM

## 2023-10-27 RX ORDER — OXYCODONE HYDROCHLORIDE 5 MG/1
5 TABLET ORAL EVERY 4 HOURS PRN
Status: DISCONTINUED | OUTPATIENT
Start: 2023-10-27 | End: 2023-10-29 | Stop reason: HOSPADM

## 2023-10-27 RX ORDER — HEPARIN SODIUM 5000 [USP'U]/ML
5000 INJECTION, SOLUTION INTRAVENOUS; SUBCUTANEOUS EVERY 8 HOURS
Status: DISCONTINUED | OUTPATIENT
Start: 2023-10-27 | End: 2023-10-29 | Stop reason: HOSPADM

## 2023-10-27 RX ORDER — CARVEDILOL 6.25 MG/1
6.25 TABLET ORAL
COMMUNITY

## 2023-10-27 RX ORDER — ACETAMINOPHEN 325 MG/1
650 TABLET ORAL EVERY 6 HOURS PRN
Status: DISCONTINUED | OUTPATIENT
Start: 2023-10-27 | End: 2023-10-29 | Stop reason: HOSPADM

## 2023-10-27 RX ORDER — OXYCODONE HYDROCHLORIDE 5 MG/1
5 TABLET ORAL
Status: DISCONTINUED | OUTPATIENT
Start: 2023-10-27 | End: 2023-10-27

## 2023-10-27 RX ORDER — CARVEDILOL 6.25 MG/1
6.25 TABLET ORAL 2 TIMES DAILY WITH MEALS
Status: DISCONTINUED | OUTPATIENT
Start: 2023-10-27 | End: 2023-10-29 | Stop reason: HOSPADM

## 2023-10-27 RX ORDER — LABETALOL HYDROCHLORIDE 5 MG/ML
10 INJECTION, SOLUTION INTRAVENOUS EVERY 4 HOURS PRN
Status: DISCONTINUED | OUTPATIENT
Start: 2023-10-27 | End: 2023-10-29 | Stop reason: HOSPADM

## 2023-10-27 RX ORDER — PROMETHAZINE HYDROCHLORIDE 25 MG/1
12.5-25 TABLET ORAL EVERY 4 HOURS PRN
Status: DISCONTINUED | OUTPATIENT
Start: 2023-10-27 | End: 2023-10-29 | Stop reason: HOSPADM

## 2023-10-27 RX ORDER — POLYETHYLENE GLYCOL 3350 17 G/17G
1 POWDER, FOR SOLUTION ORAL
Status: DISCONTINUED | OUTPATIENT
Start: 2023-10-27 | End: 2023-10-29 | Stop reason: HOSPADM

## 2023-10-27 RX ORDER — SODIUM BICARBONATE 650 MG/1
650 TABLET ORAL 3 TIMES DAILY
Status: DISCONTINUED | OUTPATIENT
Start: 2023-10-27 | End: 2023-10-29 | Stop reason: HOSPADM

## 2023-10-27 RX ORDER — SODIUM CHLORIDE 9 MG/ML
1000 INJECTION, SOLUTION INTRAVENOUS ONCE
Status: COMPLETED | OUTPATIENT
Start: 2023-10-27 | End: 2023-10-27

## 2023-10-27 RX ORDER — FUROSEMIDE 20 MG/1
20 TABLET ORAL DAILY
Status: ON HOLD | COMMUNITY
End: 2023-10-29

## 2023-10-27 RX ORDER — PROCHLORPERAZINE EDISYLATE 5 MG/ML
5-10 INJECTION INTRAMUSCULAR; INTRAVENOUS EVERY 4 HOURS PRN
Status: DISCONTINUED | OUTPATIENT
Start: 2023-10-27 | End: 2023-10-29 | Stop reason: HOSPADM

## 2023-10-27 RX ORDER — METHADONE HYDROCHLORIDE 10 MG/1
20 TABLET ORAL DAILY
Status: DISCONTINUED | OUTPATIENT
Start: 2023-10-27 | End: 2023-10-29 | Stop reason: HOSPADM

## 2023-10-27 RX ORDER — HYDROMORPHONE HYDROCHLORIDE 1 MG/ML
0.25 INJECTION, SOLUTION INTRAMUSCULAR; INTRAVENOUS; SUBCUTANEOUS
Status: DISCONTINUED | OUTPATIENT
Start: 2023-10-27 | End: 2023-10-27

## 2023-10-27 RX ORDER — PROMETHAZINE HYDROCHLORIDE 25 MG/1
12.5-25 SUPPOSITORY RECTAL EVERY 4 HOURS PRN
Status: DISCONTINUED | OUTPATIENT
Start: 2023-10-27 | End: 2023-10-29 | Stop reason: HOSPADM

## 2023-10-27 RX ORDER — OXYCODONE HYDROCHLORIDE 5 MG/1
2.5 TABLET ORAL
Status: DISCONTINUED | OUTPATIENT
Start: 2023-10-27 | End: 2023-10-27

## 2023-10-27 RX ORDER — SODIUM CHLORIDE 9 MG/ML
INJECTION, SOLUTION INTRAVENOUS CONTINUOUS
Status: DISCONTINUED | OUTPATIENT
Start: 2023-10-27 | End: 2023-10-27

## 2023-10-27 RX ORDER — AMOXICILLIN 250 MG
2 CAPSULE ORAL 2 TIMES DAILY
Status: DISCONTINUED | OUTPATIENT
Start: 2023-10-27 | End: 2023-10-29 | Stop reason: HOSPADM

## 2023-10-27 RX ORDER — ONDANSETRON 2 MG/ML
4 INJECTION INTRAMUSCULAR; INTRAVENOUS EVERY 4 HOURS PRN
Status: DISCONTINUED | OUTPATIENT
Start: 2023-10-27 | End: 2023-10-29 | Stop reason: HOSPADM

## 2023-10-27 RX ORDER — CALCIUM GLUCONATE 20 MG/ML
2 INJECTION, SOLUTION INTRAVENOUS ONCE
Status: COMPLETED | OUTPATIENT
Start: 2023-10-27 | End: 2023-10-27

## 2023-10-27 RX ORDER — IBUPROFEN 200 MG
800 TABLET ORAL EVERY 12 HOURS PRN
Status: ON HOLD | COMMUNITY
End: 2023-10-29

## 2023-10-27 RX ORDER — NICOTINE 21 MG/24HR
21 PATCH, TRANSDERMAL 24 HOURS TRANSDERMAL
Status: DISCONTINUED | OUTPATIENT
Start: 2023-10-27 | End: 2023-10-29 | Stop reason: HOSPADM

## 2023-10-27 RX ADMIN — FUROSEMIDE 80 MG: 10 INJECTION, SOLUTION INTRAVENOUS at 14:55

## 2023-10-27 RX ADMIN — SODIUM BICARBONATE 650 MG: 650 TABLET ORAL at 11:46

## 2023-10-27 RX ADMIN — SODIUM CHLORIDE 1000 ML: 9 INJECTION, SOLUTION INTRAVENOUS at 11:05

## 2023-10-27 RX ADMIN — DEXTROSE MONOHYDRATE 25 G: 100 INJECTION, SOLUTION INTRAVENOUS at 07:11

## 2023-10-27 RX ADMIN — HEPARIN SODIUM 5000 UNITS: 5000 INJECTION, SOLUTION INTRAVENOUS; SUBCUTANEOUS at 20:30

## 2023-10-27 RX ADMIN — SODIUM ZIRCONIUM CYCLOSILICATE 10 G: 10 POWDER, FOR SUSPENSION ORAL at 14:56

## 2023-10-27 RX ADMIN — NICOTINE TRANSDERMAL SYSTEM 21 MG: 21 PATCH, EXTENDED RELEASE TRANSDERMAL at 10:55

## 2023-10-27 RX ADMIN — SODIUM BICARBONATE 650 MG: 650 TABLET ORAL at 14:54

## 2023-10-27 RX ADMIN — INSULIN HUMAN 3 UNITS: 100 INJECTION, SOLUTION PARENTERAL at 07:16

## 2023-10-27 RX ADMIN — SODIUM ZIRCONIUM CYCLOSILICATE 10 G: 10 POWDER, FOR SUSPENSION ORAL at 21:26

## 2023-10-27 RX ADMIN — DOCUSATE SODIUM 50 MG AND SENNOSIDES 8.6 MG 2 TABLET: 8.6; 5 TABLET, FILM COATED ORAL at 10:55

## 2023-10-27 RX ADMIN — SODIUM CHLORIDE: 9 INJECTION, SOLUTION INTRAVENOUS at 11:47

## 2023-10-27 RX ADMIN — HEPARIN SODIUM 5000 UNITS: 5000 INJECTION, SOLUTION INTRAVENOUS; SUBCUTANEOUS at 10:55

## 2023-10-27 RX ADMIN — CALCIUM GLUCONATE 2 G: 20 INJECTION, SOLUTION INTRAVENOUS at 07:48

## 2023-10-27 RX ADMIN — METHADONE HYDROCHLORIDE 20 MG: 10 TABLET ORAL at 14:54

## 2023-10-27 RX ADMIN — OXYCODONE HYDROCHLORIDE 5 MG: 5 TABLET ORAL at 11:46

## 2023-10-27 RX ADMIN — SODIUM BICARBONATE 650 MG: 650 TABLET ORAL at 20:30

## 2023-10-27 RX ADMIN — DOCUSATE SODIUM 50 MG AND SENNOSIDES 8.6 MG 2 TABLET: 8.6; 5 TABLET, FILM COATED ORAL at 16:06

## 2023-10-27 ASSESSMENT — LIFESTYLE VARIABLES
AVERAGE NUMBER OF DAYS PER WEEK YOU HAVE A DRINK CONTAINING ALCOHOL: 0
HAVE PEOPLE ANNOYED YOU BY CRITICIZING YOUR DRINKING: NO
EVER FELT BAD OR GUILTY ABOUT YOUR DRINKING: NO
HAVE YOU EVER FELT YOU SHOULD CUT DOWN ON YOUR DRINKING: NO
HOW MANY TIMES IN THE PAST YEAR HAVE YOU HAD 5 OR MORE DRINKS IN A DAY: 0
EVER HAD A DRINK FIRST THING IN THE MORNING TO STEADY YOUR NERVES TO GET RID OF A HANGOVER: NO
TOTAL SCORE: 0
ALCOHOL_USE: NO
ON A TYPICAL DAY WHEN YOU DRINK ALCOHOL HOW MANY DRINKS DO YOU HAVE: 0
TOTAL SCORE: 0
TOTAL SCORE: 0
CONSUMPTION TOTAL: NEGATIVE

## 2023-10-27 ASSESSMENT — COGNITIVE AND FUNCTIONAL STATUS - GENERAL
MOBILITY SCORE: 24
SUGGESTED CMS G CODE MODIFIER DAILY ACTIVITY: CH
SUGGESTED CMS G CODE MODIFIER MOBILITY: CH
DAILY ACTIVITIY SCORE: 24

## 2023-10-27 ASSESSMENT — PATIENT HEALTH QUESTIONNAIRE - PHQ9
1. LITTLE INTEREST OR PLEASURE IN DOING THINGS: NOT AT ALL
2. FEELING DOWN, DEPRESSED, IRRITABLE, OR HOPELESS: NOT AT ALL
SUM OF ALL RESPONSES TO PHQ9 QUESTIONS 1 AND 2: 0
1. LITTLE INTEREST OR PLEASURE IN DOING THINGS: NOT AT ALL
2. FEELING DOWN, DEPRESSED, IRRITABLE, OR HOPELESS: NOT AT ALL
SUM OF ALL RESPONSES TO PHQ9 QUESTIONS 1 AND 2: 0

## 2023-10-27 ASSESSMENT — FIBROSIS 4 INDEX
FIB4 SCORE: 1.43
FIB4 SCORE: 1.43

## 2023-10-27 ASSESSMENT — PAIN DESCRIPTION - PAIN TYPE
TYPE: ACUTE PAIN
TYPE: ACUTE PAIN

## 2023-10-27 ASSESSMENT — PAIN DESCRIPTION - DESCRIPTORS: DESCRIPTORS: SHARP

## 2023-10-27 NOTE — ASSESSMENT & PLAN NOTE
AYAN on CKD 3  Creatinine 2.77 today has received one time dose of IV lasix yesterday   Renal ultrasound negative for acute pathology or hydronephrosis  Nephrology following appreciate rec.
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
## 2023-10-27 NOTE — ED PROVIDER NOTES
ED Provider Note    CHIEF COMPLAINT  Chief Complaint   Patient presents with    Chest Pain     C/o sternal pain x 3 wks, progressively worsening. Rate pain 7/10 w/ movement. Denies SOB. Hx CHF.        EXTERNAL RECORDS REVIEWED  Patient's last encounter was a hospital admission in 2014 he was seen for lower extremities weakness, polysubstance use, opioid dependence and amphetamine use.    HPI/ROS  LIMITATION TO HISTORY   Select: : None  OUTSIDE HISTORIAN(S):  Friend who echoes patient's symptoms as well as concern for opioid withdrawal    Eugenio Telles is a 63 y.o. male who presents accompanied by a friend with a chief complaint of 3 weeks of chest wall pain.  This all started when he reports that he fell asleep, flexed forward, leaning with his sternum on his knees.  He has not previously been evaluated for it.  Pain worsens with movement is extremely bad with coughing or rotating his head.  He denies any fevers.  He has a history of congestive heart failure.  He has a history of an elevated creatinine.  He states a few years ago he was seen at a local nephrology clinic but has not followed up.  He believes this was [facility] nephrology.  He is reports having stage III kidney failure.  He is not on dialysis.  He has a long history of chronic heroin use.  This goes back to the 1980s.  He is worried about undergoing withdrawal while here in the ED and during admission.  Symptoms are not suggestive of ACS.  No shortness of breath.  No diaphoresis.  No radiation of pain.  No jaw or neck pain.    PAST MEDICAL HISTORY   has a past medical history of Arthritis, Back pain, Hemangioma, Hepatitis C, Hypertension, Kidney stone, and Pneumonia (1995).  Scoliosis.  Chronic heroin use    SURGICAL HISTORY   has a past surgical history that includes other abdominal surgery and other.    FAMILY HISTORY  History reviewed. No pertinent family history.    SOCIAL HISTORY  Social History     Tobacco Use    Smoking status: Every  "Day     Current packs/day: 0.50     Average packs/day: 0.5 packs/day for 40.4 years (20.2 ttl pk-yrs)     Types: Cigarettes     Start date: 6/7/1983    Smokeless tobacco: Never   Substance and Sexual Activity    Alcohol use: No    Drug use: Yes     Types: Intravenous     Comment: heroin and meth (last used heroin 1 hour ago)    Sexual activity: Not on file       CURRENT MEDICATIONS  Home Medications       Reviewed by Mame Dawn (Pharmacy Tech) on 10/27/23 at 0749  Med List Status: Complete     Medication Last Dose Status   carvedilol (COREG) 6.25 MG Tab 10/27/2023 Active   furosemide (LASIX) 20 MG Tab 10/27/2023 Active   ibuprofen (MOTRIN) 200 MG Tab 10/27/2023 Active   lisinopril (PRINIVIL) 10 MG Tab 10/27/2023 Active   spironolactone (ALDACTONE) 25 MG Tab 10/27/2023 Active                    ALLERGIES  No Known Allergies    PHYSICAL EXAM  VITAL SIGNS: /73   Pulse 77   Temp 36.4 °C (97.5 °F) (Temporal)   Resp 16   Ht 1.549 m (5' 1\")   Wt 64.7 kg (142 lb 10.2 oz)   SpO2 96%   BMI 26.95 kg/m²    Vitals reviewed.  Constitutional: Patient is oriented to person, place, and time. Appears well-developed and well-nourished. Mild distress.    Head: Normocephalic and atraumatic.   Ears: Normal external ears bilaterally.   Mouth/Throat: Oropharynx is clear   Eyes: Conjunctivae are normal. Pupils are equal, round  Neck: Normal range of motion.   Cardiovascular: Normal rate, regular rhythm and normal heart sounds. Normal peripheral pulses.  Pulmonary/Chest: Effort normal and breath sounds normal. No respiratory distress, no wheezes, rhonchi, or rales. Anterior chest wall tenderness.  Abdominal: Soft. Bowel sounds are normal. There is no tenderness.  Musculoskeletal: No edema and no tenderness. Scoliosis.  Neurological: No cranial nerve deficits. Normal motor and sensory exam. No focal deficits.   Skin: Skin is warm and dry. No erythema. No pallor.   Psychiatric: Patient has a normal mood and affect. "     DIAGNOSTIC STUDIES / PROCEDURES  EKG  I have independently interpreted this EKG    LABS  Results for orders placed or performed during the hospital encounter of 10/27/23   CBC with Differential   Result Value Ref Range    WBC 7.9 4.8 - 10.8 K/uL    RBC 5.16 4.70 - 6.10 M/uL    Hemoglobin 15.4 14.0 - 18.0 g/dL    Hematocrit 44.6 42.0 - 52.0 %    MCV 86.4 81.4 - 97.8 fL    MCH 29.8 27.0 - 33.0 pg    MCHC 34.5 32.3 - 36.5 g/dL    RDW 41.7 35.9 - 50.0 fL    Platelet Count 245 164 - 446 K/uL    MPV 9.5 9.0 - 12.9 fL    Neutrophils-Polys 63.70 44.00 - 72.00 %    Lymphocytes 23.80 22.00 - 41.00 %    Monocytes 8.60 0.00 - 13.40 %    Eosinophils 2.90 0.00 - 6.90 %    Basophils 0.60 0.00 - 1.80 %    Immature Granulocytes 0.40 0.00 - 0.90 %    Nucleated RBC 0.00 0.00 - 0.20 /100 WBC    Neutrophils (Absolute) 5.05 1.82 - 7.42 K/uL    Lymphs (Absolute) 1.89 1.00 - 4.80 K/uL    Monos (Absolute) 0.68 0.00 - 0.85 K/uL    Eos (Absolute) 0.23 0.00 - 0.51 K/uL    Baso (Absolute) 0.05 0.00 - 0.12 K/uL    Immature Granulocytes (abs) 0.03 0.00 - 0.11 K/uL    NRBC (Absolute) 0.00 K/uL   Complete Metabolic Panel (CMP)   Result Value Ref Range    Sodium 132 (L) 135 - 145 mmol/L    Potassium 6.1 (H) 3.6 - 5.5 mmol/L    Chloride 97 96 - 112 mmol/L    Co2 20 20 - 33 mmol/L    Anion Gap 15.0 7.0 - 16.0    Glucose 126 (H) 65 - 99 mg/dL    Bun 110 (H) 8 - 22 mg/dL    Creatinine 3.42 (H) 0.50 - 1.40 mg/dL    Calcium 10.2 8.5 - 10.5 mg/dL    Correct Calcium 9.8 8.5 - 10.5 mg/dL    AST(SGOT) 15 12 - 45 U/L    ALT(SGPT) 7 2 - 50 U/L    Alkaline Phosphatase 98 30 - 99 U/L    Total Bilirubin 0.2 0.1 - 1.5 mg/dL    Albumin 4.5 3.2 - 4.9 g/dL    Total Protein 8.6 (H) 6.0 - 8.2 g/dL    Globulin 4.1 (H) 1.9 - 3.5 g/dL    A-G Ratio 1.1 g/dL   Troponins NOW   Result Value Ref Range    Troponin T 46 (H) 6 - 19 ng/L   Troponins in two (2) hours   Result Value Ref Range    Troponin T 45 (H) 6 - 19 ng/L   ESTIMATED GFR   Result Value Ref Range    GFR  (CKD-EPI) 19 (A) >60 mL/min/1.73 m 2   Basic Metabolic Panel   Result Value Ref Range    Sodium 133 (L) 135 - 145 mmol/L    Potassium 6.7 (HH) 3.6 - 5.5 mmol/L    Chloride 98 96 - 112 mmol/L    Co2 19 (L) 20 - 33 mmol/L    Glucose 109 (H) 65 - 99 mg/dL    Bun 111 (H) 8 - 22 mg/dL    Creatinine 3.61 (H) 0.50 - 1.40 mg/dL    Calcium 9.9 8.5 - 10.5 mg/dL    Anion Gap 16.0 7.0 - 16.0   MAGNESIUM   Result Value Ref Range    Magnesium 2.6 (H) 1.5 - 2.5 mg/dL   PHOSPHORUS   Result Value Ref Range    Phosphorus 5.8 (H) 2.5 - 4.5 mg/dL   ESTIMATED GFR   Result Value Ref Range    GFR (CKD-EPI) 18 (A) >60 mL/min/1.73 m 2   EKG   Result Value Ref Range    Report       [facility]    Test Date:  2023-10-27  Pt Name:    FORD LEDEZMA                Department: ER  MRN:        [id number]                      Room:  Gender:     Male                         Technician: [id number]  :        1960                   Requested By:ER TRIAGE PROTOCOL  Order #:    [id number]                    Reading MD: MARTIN COTTON DO    Measurements  Intervals                                Axis  Rate:       85                           P:          57  CO:         163                          QRS:        180  QRSD:       119                          T:          31  QT:         375  QTc:        446    Interpretive Statements  Sinus rhythm  Probable left atrial enlargement  Nonspecific intraventricular conduction delay  Compared to ECG 2014 08:41:56  Left ventricular hypertrophy no longer present  Early repolarization no longer present  Electronically Signed On 10- 05:54:55 PDT by MARTIN COTTON DO     POCT glucose device results   Result Value Ref Range    POC Glucose, Blood 104 (H) 65 - 99 mg/dL     RADIOLOGY  I have independently interpreted the diagnostic imaging associated with this visit and am waiting the final reading from the radiologist.   My preliminary interpretation is as follows: Increased  "lung markings, left base, question of lower infiltrate.  Radiologist interpretation:   US-RENAL   Final Result      1.  Limited exam with poor visualization of both kidneys. No gross hydronephrosis.      DX-CHEST-PORTABLE (1 VIEW)   Final Result         1.  Bibasilar opacities, greater on the left, appearance favoring infiltrates.      EC-ECHOCARDIOGRAM COMPLETE W/O CONT    (Results Pending)       COURSE & MEDICAL DECISION MAKING    ED Observation Status? No; Patient does not meet criteria for ED Observation.     INITIAL ASSESSMENT, COURSE AND PLAN  Care Narrative:   This is a pleasant 63-year-old male.  He presents with chest wall pain for 3 weeks.  Its been bilaterally constant.  Made worse with movement.  Doubt ACS.  He reports a history of stage III kidney disease.  Today, creatinine is elevated 3.4.  It has trended up for the last 3 years.  Most recent labs are 2 years ago is creatinine was 2.1.  He previously has not had an elevated potassium.  Today 6.1.  I have ordered dextrose, insulin and calcium.  He does have peaked T waves, laterally, T wave inversion.  There is narrow complex on his EKG.  Patient initially, had suggested that he be discharged in order to \"get his affairs in order\" however, I have advised, based on the findings currently, I cannot safely do this.  He is quite concerned about going into withdrawal from opioids as he is habituated to heroin and has been for decades.    7:26 AM D/W Dr. Eubanks, Nephrology, who will review their prior clinic notes and agrees to see the patient in consultation.    7:42 AM D/W Hospitalist, Dr. Kumar, who agrees to admit the patient to their service.  She has been patient's opioid dependence without plan for cessation and his concern about experiencing withdrawal while admitted.    DISPOSITION AND DISCUSSIONS  I have discussed management of the patient with the following physicians and LULI's: Hospitalist, nephrology    Discussion of management with other QHP or " appropriate source(s): None     Escalation of care considered, and ultimately not performed: None    Barriers to care at this time, including but not limited to:  None .     FINAL DIAGNOSIS  1. Chest wall pain    2. Acute renal failure superimposed on chronic kidney disease, unspecified acute renal failure type, unspecified CKD stage (HCC)    3. Hyperkalemia    4. Heroin dependence (HCC)           Electronically signed by: Marjorie Velasco D.O., 10/27/2023 5:24 AM

## 2023-10-27 NOTE — ED NOTES
Med rec complete per pt's rx bottles at bedside- reviewed with pt. Meds returned to pt's son at bedside. FELIPA

## 2023-10-27 NOTE — ASSESSMENT & PLAN NOTE
Reported a history of HFrEF.  He cannot remember what was his last EF  Continue home Coreg  Hold home spironolactone, lisinopril, and Lasix due to AYAN  Stable, no acute exacerbation    I ordered an echo

## 2023-10-27 NOTE — ED NOTES
Bedside report from April, RN. POC discussed. Pt is GCS 15. All standard fall precautions in place. Pt denies any needs at this time.

## 2023-10-27 NOTE — ASSESSMENT & PLAN NOTE
chest pain for 3 weeks while he was coughing or moving his body, from chest, intermittent, self resolved, no radiation, not associated with nausea vomiting or shortness of breath.  EKG no acute ischemic or ST changes.     Likely MSK related  No need for further work-up

## 2023-10-27 NOTE — ED NOTES
Dr. Velasco notified of critical lab result at 0635 for Potassium of 6.1.  Critical lab result read back by Dr. Velasco.

## 2023-10-27 NOTE — H&P
Hospital Medicine History & Physical Note    Date of Service  10/27/2023    Primary Care Physician  Damian Garcia M.D.    Consultants  nephrology    Specialist Names:     Code Status  Full Code    Chief Complaint  Chief Complaint   Patient presents with    Chest Pain     C/o sternal pain x 3 wks, progressively worsening. Rate pain 7/10 w/ movement. Denies SOB. Hx CHF.        History of Presenting Illness  Eugenio Telles is a 63 y.o. male with a history of hypertension, daily heroin use, nicotine dependence, CHFrEF, CKD3, who presented 10/27/2023 with chest pain for 3 weeks.  Patient reported the chest pain for 3 weeks while he was coughing or moving his body, intermittent, self resolved, no radiation, not associated with nausea vomiting or shortness of breath. No diarrhea, constipation, dysuria    Patient reported daily iv heroin use for 20 years, no intention to quit.  He stated he use a maintenance dose.     Remarkable lab: Trop 46, K  6.7, Na 132, Cre  3.42    I discussed the plan of care with patient and pharmacy.    Review of Systems  All 12 systems were reviewed and negative except as mentioned above      Past Medical History   has a past medical history of Arthritis, Back pain, Hemangioma, Hepatitis C, Hypertension, Kidney stone, and Pneumonia (1995). daily heroin use, nicotine dependence, CHFrEF    Surgical History   has a past surgical history that includes other abdominal surgery and other.       Family History  family history is not on file.   Family history reviewed with patient. There is no family history that is pertinent to the chief complaint.     Social History   reports that he has been smoking cigarettes. He started smoking about 40 years ago. He has a 20.2 pack-year smoking history. He has never used smokeless tobacco. He reports current drug use. Drug: Intravenous. He reports that he does not drink alcohol.  daily heroin use, nicotine dependence    Allergies  No Known  "Allergies    Medications  None       Physical Exam  Temp:  [35.9 °C (96.7 °F)-36.5 °C (97.7 °F)] 36.5 °C (97.7 °F)  Pulse:  [68-89] 73  Resp:  [14-20] 16  BP: ()/(61-79) 94/61  SpO2:  [91 %-98 %] 91 %  Blood Pressure: 111/77   Temperature: 35.9 °C (96.7 °F)   Pulse: 77   Respiration: 20   Pulse Oximetry: 94 %       Physical Exam  Constitutional:       Appearance: He is ill-appearing.   HENT:      Head: Normocephalic.      Nose: Nose normal.      Mouth/Throat:      Mouth: Mucous membranes are moist.   Eyes:      Extraocular Movements: Extraocular movements intact.      Conjunctiva/sclera: Conjunctivae normal.   Cardiovascular:      Rate and Rhythm: Normal rate and regular rhythm.      Pulses: Normal pulses.      Heart sounds: Normal heart sounds.   Pulmonary:      Effort: Pulmonary effort is normal.   Abdominal:      General: Bowel sounds are normal.      Palpations: Abdomen is soft.   Musculoskeletal:         General: No tenderness.      Cervical back: Normal range of motion and neck supple.      Right lower leg: Edema present.      Left lower leg: Edema present.      Comments: LE Edema 1+   Skin:     General: Skin is warm.   Neurological:      Mental Status: He is alert and oriented to person, place, and time. Mental status is at baseline.   Psychiatric:         Mood and Affect: Mood normal.         Laboratory:  Recent Labs     10/27/23  0529   WBC 7.9   RBC 5.16   HEMOGLOBIN 15.4   HEMATOCRIT 44.6   MCV 86.4   MCH 29.8   MCHC 34.5   RDW 41.7   PLATELETCT 245   MPV 9.5     Recent Labs     10/27/23  0529 10/27/23  0710   SODIUM 132* 133*   POTASSIUM 6.1* 6.7*   CHLORIDE 97 98   CO2 20 19*   GLUCOSE 126* 109*   * 111*   CREATININE 3.42* 3.61*   CALCIUM 10.2 9.9     Recent Labs     10/27/23  0529 10/27/23  0710   ALTSGPT 7  --    ASTSGOT 15  --    ALKPHOSPHAT 98  --    TBILIRUBIN 0.2  --    GLUCOSE 126* 109*         No results for input(s): \"NTPROBNP\" in the last 72 hours.      Recent Labs     " 10/27/23  0529 10/27/23  0710   TROPONINT 46* 45*       Imaging:  US-RENAL   Final Result      1.  Limited exam with poor visualization of both kidneys. No gross hydronephrosis.      DX-CHEST-PORTABLE (1 VIEW)   Final Result         1.  Bibasilar opacities, greater on the left, appearance favoring infiltrates.      EC-ECHOCARDIOGRAM COMPLETE W/O CONT    (Results Pending)       EKG:  I have personally reviewed the images and compared with prior images.    Assessment/Plan:  Justification for Admission Status  I anticipate this patient will require at least two midnights for appropriate medical management, necessitating inpatient admission because AYAN, hyperkalemia, CHF, needs close monitor fluid status    Patient will need a Telemetry bed on EMERGENCY service .  The need is secondary to KI, hyperkalemia, CHF, needs close monitor fluid status    * AYAN (acute kidney injury) ([facility])- (present on admission)  Assessment & Plan  AYAN on CKD 3  Renal ultrasound negative for acute pathology or hydronephrosis  Nephro consulted    IV fluid, be conservative given history of CHFrEF  Home lisinopril, spironolactone, and Lasix on hold  Continue to monitor  I repeated the echo    Hyperkalemia  Assessment & Plan  Potassium 6.7  Received hyperkalemia treatment  Nephrology consulted    Continue to monitor.  I ordered repeat labs    Heroin dependence ([facility])  Assessment & Plan  Patient reported daily iv heroin use for 20 years, no intention to quit.  He stated he use a maintenance dose.     I discussed with the pharmacy, will start on low-dose methadone  May not prescribe methadone at discharge since the patient is going to continue IV heroin    Dependence on nicotine from cigarettes  Assessment & Plan  Smoke cessation education was given at bedside  I ordered a nicotine patch    HFrEF (heart failure with reduced ejection fraction) ([facility])  Assessment & Plan  Reported a history of HFrEF.  He cannot remember what was his last EF  Continue home  Coreg  Hold home spironolactone, lisinopril, and Lasix due to AYAN  Stable, no acute exacerbation    I ordered an echo    Other chest pain  Assessment & Plan  chest pain for 3 weeks while he was coughing or moving his body, from chest, intermittent, self resolved, no radiation, not associated with nausea vomiting or shortness of breath.  EKG no acute ischemic or ST changes.     Likely MSK related  No need for further work-up        VTE prophylaxis: heparin ppx

## 2023-10-27 NOTE — ED TRIAGE NOTES
"Chief Complaint   Patient presents with    Chest Pain     C/o sternal pain x 3 wks, progressively worsening. Rate pain 7/10 w/ movement. Denies SOB. Hx CHF.        62 y/o male wheelchair to triage for above complaint. Chest pain protocol orders in process.     Pt place in waiting area. Educated on triage process. Pt will inform staff of any medical changes.    /79   Pulse 89   Temp 35.9 °C (96.7 °F) (Temporal)   Resp 18   Ht 1.549 m (5' 1\")   Wt 63.5 kg (140 lb)   SpO2 98%   BMI 26.45 kg/m²     "

## 2023-10-28 LAB
ALBUMIN SERPL BCP-MCNC: 4.1 G/DL (ref 3.2–4.9)
ALBUMIN/GLOB SERPL: 1 G/DL
ALP SERPL-CCNC: 92 U/L (ref 30–99)
ALT SERPL-CCNC: 6 U/L (ref 2–50)
AMPHET UR QL SCN: POSITIVE
ANION GAP SERPL CALC-SCNC: 14 MMOL/L (ref 7–16)
AST SERPL-CCNC: 15 U/L (ref 12–45)
BARBITURATES UR QL SCN: NEGATIVE
BENZODIAZ UR QL SCN: NEGATIVE
BILIRUB SERPL-MCNC: 0.4 MG/DL (ref 0.1–1.5)
BUN SERPL-MCNC: 90 MG/DL (ref 8–22)
BZE UR QL SCN: NEGATIVE
CALCIUM ALBUM COR SERPL-MCNC: 9.8 MG/DL (ref 8.5–10.5)
CALCIUM SERPL-MCNC: 9.9 MG/DL (ref 8.5–10.5)
CANNABINOIDS UR QL SCN: NEGATIVE
CHLORIDE SERPL-SCNC: 99 MMOL/L (ref 96–112)
CO2 SERPL-SCNC: 21 MMOL/L (ref 20–33)
CREAT SERPL-MCNC: 2.77 MG/DL (ref 0.5–1.4)
ERYTHROCYTE [DISTWIDTH] IN BLOOD BY AUTOMATED COUNT: 41.5 FL (ref 35.9–50)
FENTANYL UR QL: NEGATIVE
GFR SERPLBLD CREATININE-BSD FMLA CKD-EPI: 25 ML/MIN/1.73 M 2
GLOBULIN SER CALC-MCNC: 4.2 G/DL (ref 1.9–3.5)
GLUCOSE SERPL-MCNC: 97 MG/DL (ref 65–99)
HCT VFR BLD AUTO: 44.1 % (ref 42–52)
HGB BLD-MCNC: 15.5 G/DL (ref 14–18)
MCH RBC QN AUTO: 30.3 PG (ref 27–33)
MCHC RBC AUTO-ENTMCNC: 35.1 G/DL (ref 32.3–36.5)
MCV RBC AUTO: 86.3 FL (ref 81.4–97.8)
METHADONE UR QL SCN: POSITIVE
OPIATES UR QL SCN: POSITIVE
OXYCODONE UR QL SCN: POSITIVE
PCP UR QL SCN: NEGATIVE
PLATELET # BLD AUTO: 200 K/UL (ref 164–446)
PMV BLD AUTO: 9.3 FL (ref 9–12.9)
POTASSIUM SERPL-SCNC: 5.1 MMOL/L (ref 3.6–5.5)
PROPOXYPH UR QL SCN: NEGATIVE
PROT SERPL-MCNC: 8.3 G/DL (ref 6–8.2)
RBC # BLD AUTO: 5.11 M/UL (ref 4.7–6.1)
SODIUM SERPL-SCNC: 134 MMOL/L (ref 135–145)
WBC # BLD AUTO: 9.2 K/UL (ref 4.8–10.8)

## 2023-10-28 PROCEDURE — 36415 COLL VENOUS BLD VENIPUNCTURE: CPT

## 2023-10-28 PROCEDURE — A9270 NON-COVERED ITEM OR SERVICE: HCPCS | Performed by: STUDENT IN AN ORGANIZED HEALTH CARE EDUCATION/TRAINING PROGRAM

## 2023-10-28 PROCEDURE — 700102 HCHG RX REV CODE 250 W/ 637 OVERRIDE(OP): Performed by: STUDENT IN AN ORGANIZED HEALTH CARE EDUCATION/TRAINING PROGRAM

## 2023-10-28 PROCEDURE — 97602 WOUND(S) CARE NON-SELECTIVE: CPT

## 2023-10-28 PROCEDURE — 99233 SBSQ HOSP IP/OBS HIGH 50: CPT | Performed by: INTERNAL MEDICINE

## 2023-10-28 PROCEDURE — 80053 COMPREHEN METABOLIC PANEL: CPT

## 2023-10-28 PROCEDURE — 700111 HCHG RX REV CODE 636 W/ 250 OVERRIDE (IP): Performed by: STUDENT IN AN ORGANIZED HEALTH CARE EDUCATION/TRAINING PROGRAM

## 2023-10-28 PROCEDURE — 85027 COMPLETE CBC AUTOMATED: CPT

## 2023-10-28 PROCEDURE — C9399 UNCLASSIFIED DRUGS OR BIOLOG: HCPCS | Performed by: STUDENT IN AN ORGANIZED HEALTH CARE EDUCATION/TRAINING PROGRAM

## 2023-10-28 PROCEDURE — 770020 HCHG ROOM/CARE - TELE (206)

## 2023-10-28 PROCEDURE — 80307 DRUG TEST PRSMV CHEM ANLYZR: CPT

## 2023-10-28 RX ADMIN — METHADONE HYDROCHLORIDE 20 MG: 10 TABLET ORAL at 04:48

## 2023-10-28 RX ADMIN — SODIUM ZIRCONIUM CYCLOSILICATE 10 G: 10 POWDER, FOR SUSPENSION ORAL at 21:29

## 2023-10-28 RX ADMIN — SODIUM BICARBONATE 650 MG: 650 TABLET ORAL at 17:02

## 2023-10-28 RX ADMIN — NICOTINE TRANSDERMAL SYSTEM 21 MG: 21 PATCH, EXTENDED RELEASE TRANSDERMAL at 04:48

## 2023-10-28 RX ADMIN — SODIUM ZIRCONIUM CYCLOSILICATE 10 G: 10 POWDER, FOR SUSPENSION ORAL at 10:35

## 2023-10-28 RX ADMIN — HEPARIN SODIUM 5000 UNITS: 5000 INJECTION, SOLUTION INTRAVENOUS; SUBCUTANEOUS at 13:07

## 2023-10-28 RX ADMIN — SODIUM ZIRCONIUM CYCLOSILICATE 10 G: 10 POWDER, FOR SUSPENSION ORAL at 15:18

## 2023-10-28 RX ADMIN — SODIUM BICARBONATE 650 MG: 650 TABLET ORAL at 07:47

## 2023-10-28 RX ADMIN — HEPARIN SODIUM 5000 UNITS: 5000 INJECTION, SOLUTION INTRAVENOUS; SUBCUTANEOUS at 04:47

## 2023-10-28 RX ADMIN — SODIUM BICARBONATE 650 MG: 650 TABLET ORAL at 21:29

## 2023-10-28 RX ADMIN — HEPARIN SODIUM 5000 UNITS: 5000 INJECTION, SOLUTION INTRAVENOUS; SUBCUTANEOUS at 21:29

## 2023-10-28 ASSESSMENT — FIBROSIS 4 INDEX: FIB4 SCORE: 1.9

## 2023-10-28 ASSESSMENT — PAIN DESCRIPTION - PAIN TYPE: TYPE: ACUTE PAIN

## 2023-10-28 NOTE — CARE PLAN
The patient is Watcher - Medium risk of patient condition declining or worsening    Shift Goals  Clinical Goals: No CP, potassium WNL    Progress made toward(s) clinical / shift goals:      Patient is not progressing towards the following goals:      Problem: Pain - Standard  Goal: Alleviation of pain or a reduction in pain to the patient’s comfort goal  Outcome: Progressing     Problem: Knowledge Deficit - Standard  Goal: Patient and family/care givers will demonstrate understanding of plan of care, disease process/condition, diagnostic tests and medications  Outcome: Progressing     Problem: Fall Risk  Goal: Patient will remain free from falls  Outcome: Progressing

## 2023-10-28 NOTE — PROGRESS NOTES
Hospital Medicine Daily Progress Note    Date of Service  10/28/2023    Chief Complaint  Eugenio Telles is a 63 y.o. male admitted 10/27/2023 with chest pain     Hospital Course  This is a 63 y.o. male with a history of hypertension, daily heroin use, nicotine dependence, CHFrEF, CKD3, who presented 10/27/2023 with chest pain for 3 weeks.  Patient reported the chest pain for 3 weeks while he was coughing or moving his body, intermittent, self resolved, no radiation, not associated with nausea vomiting or shortness of breath  In ER he was found to have acute on chronic renal failure and also hyperkalemia with potassium of 6.7.  Patient admitted for further treatment and nephrology was consulted     Interval Problem Update  Patient seen an examined, afebrile denies any nausea or vomiting, still with some chest pain with cough.   Potassium 5.1 today,, and creatinine 2.77  Nephrology following appreciate rec.     I have discussed this patient's plan of care and discharge plan at IDT rounds today with Case Management, Nursing, Nursing leadership, and other members of the IDT team.    Consultants/Specialty  nephrology    Code Status  Full Code    Disposition  The patient is not medically cleared for discharge to home or a post-acute facility.      I have placed the appropriate orders for post-discharge needs.    Review of Systems  ROS     Physical Exam  Temp:  [36.3 °C (97.3 °F)-36.7 °C (98.1 °F)] 36.7 °C (98.1 °F)  Pulse:  [66-88] 77  Resp:  [16-19] 18  BP: ()/(60-74) 99/74  SpO2:  [88 %-94 %] 94 %    Physical Exam    Fluids    Intake/Output Summary (Last 24 hours) at 10/28/2023 1328  Last data filed at 10/28/2023 1212  Gross per 24 hour   Intake 240 ml   Output 2575 ml   Net -2335 ml       Laboratory  Recent Labs     10/27/23  0529 10/28/23  0201   WBC 7.9 9.2   RBC 5.16 5.11   HEMOGLOBIN 15.4 15.5   HEMATOCRIT 44.6 44.1   MCV 86.4 86.3   MCH 29.8 30.3   MCHC 34.5 35.1   RDW 41.7 41.5   PLATELETCT 245 200    MPV 9.5 9.3     Recent Labs     10/27/23  1109 10/27/23  1816 10/28/23  0201   SODIUM 131* 134* 134*   POTASSIUM 5.6* 5.0 5.1   CHLORIDE 98 99 99   CO2 18* 22 21   GLUCOSE 140* 115* 97   * 91* 90*   CREATININE 3.41* 2.99* 2.77*   CALCIUM 10.1 9.8 9.9                   Imaging  EC-ECHOCARDIOGRAM COMPLETE W/O CONT   Final Result      US-RENAL   Final Result      1.  Limited exam with poor visualization of both kidneys. No gross hydronephrosis.      DX-CHEST-PORTABLE (1 VIEW)   Final Result         1.  Bibasilar opacities, greater on the left, appearance favoring infiltrates.           Assessment/Plan  * AYAN (acute kidney injury) ([facility])- (present on admission)  Assessment & Plan  AYAN on CKD 3  Creatinine 2.77 today has received one time dose of IV lasix yesterday   Renal ultrasound negative for acute pathology or hydronephrosis  Nephrology following appreciate rec.     Dependence on nicotine from cigarettes  Assessment & Plan  Smoke cessation education was given at bedside  I ordered a nicotine patch    HFrEF (heart failure with reduced ejection fraction) ([facility])  Assessment & Plan  Reported a history of HFrEF.  He cannot remember what was his last EF  Continue home Coreg  Hold home spironolactone, lisinopril, and Lasix due to AYAN  Stable, no acute exacerbation    I ordered an echo    Hyperkalemia  Assessment & Plan  Potassium 6.7  Received hyperkalemia treatment  Potassium 5.1 today  Close monitoring     Other chest pain  Assessment & Plan  chest pain for 3 weeks while he was coughing or moving his body, from chest, intermittent, self resolved, no radiation, not associated with nausea vomiting or shortness of breath.  EKG no acute ischemic or ST changes.     Likely MSK related  No need for further work-up    Heroin dependence ([facility])  Assessment & Plan  Patient reported daily iv heroin use for 20 years, no intention to quit.  He stated he use a maintenance dose.   His urine drug screen is positive for amphetamine,  opiates and oxycodone          VTE prophylaxis: Hepairn SC    Greater than 51 minutes spent prepping to see patient (e.g. review of tests) obtaining and/or reviewing separately obtained history. Performing a medically appropriate examination and/ evaluation.  Counseling and educating the patient/family/caregiver.  Ordering medications, tests, or procedures.  Referring and communicating with other health care professionals.  Documenting clinical information in EPIC.  Independently interpreting results and communicating results to patient/family/caregiver.  Care coordination.      I have performed a physical exam and reviewed and updated ROS and Plan today (10/28/2023). In review of yesterday's note (10/27/2023), there are no changes except as documented above.

## 2023-10-28 NOTE — PROGRESS NOTES
"[facility] -  PROGRESS NOTE               Author: Doreen Eubanks M.D. Date & Time: 10/28/2023  1:34 PM     HPI:  63 y.o. male with CKD stage IIIb, hepatitis C status post treatment, heart failure with reduced ejection fraction (EF of 30 to 35% greater than 3 years old), polysubstance abuse, hypertension who presents today with a 3-week history of worsening chest pain.  Patient states he has had slowly progressing worsening substernal chest pain without radiation to the shoulder and neck that he describes as a sharp pain, not positional but worth with us with breathing in.  Patient does not remember the exact amount but he does know that he has been taking Advil daily for the last 3 weeks in an effort to help combat the chest pain, patient also endorses heroin use approximately 6 hours ago, patient notes he used to follow-up with cardiology and nephrology but has not done so since approximately 2020, given his addiction and the number of medical appointments he needed to make were not congruent with his lifestyle.  Patient has been staying well-hydrated drinking at least 3 water bottles standard size per day.  Patient denies any worsening dyspnea on exertion any fevers, chills, night sweats, cough, sputum production, dysuria, hematuria, or frequency/urgency.  Rest of 14 point review of systems negative septa as above below.     No F/C/N/V/CP/SOB.  No melena, hematochezia, hematemesis.  No HA, visual changes, or abdominal pain.    DAILY NEPHROLOGY SUMMARY:  10/27: Consult done  10/28: NAEO, feels better, still with chest pain    REVIEW OF SYSTEMS:    10 point ROS reviewed and is as per HPI/daily summary or otherwise negative    PMH/PSH/SH/FH:   Reviewed and unchanged since admission note    CURRENT MEDICATIONS:   Reviewed from admission to present day    VS:  BP 99/74   Pulse 77   Temp 36.7 °C (98.1 °F) (Temporal)   Resp 18   Ht 1.549 m (5' 1\")   Wt 64.2 kg (141 lb 8.6 oz)   SpO2 94%  "  BMI 26.74 kg/m²     Physical Exam  Nursing note reviewed.   Constitutional:       Appearance: Normal appearance.   Eyes:      General: No scleral icterus.  Cardiovascular:      Comments: No edema  Pulmonary:      Effort: Pulmonary effort is normal.   Abdominal:      General: There is no distension.   Musculoskeletal:         General: Tenderness (Chest diffusely on palpation) present. No deformity.   Skin:     Findings: No rash.   Neurological:      Mental Status: He is alert.   Psychiatric:         Behavior: Behavior is cooperative.         Fluids:  In: 360 [P.O.:360]  Out: 2275     LABS:  Recent Labs     10/27/23  1109 10/27/23  1816 10/28/23  0201   SODIUM 131* 134* 134*   POTASSIUM 5.6* 5.0 5.1   CHLORIDE 98 99 99   CO2 18* 22 21   GLUCOSE 140* 115* 97   * 91* 90*   CREATININE 3.41* 2.99* 2.77*   CALCIUM 10.1 9.8 9.9       IMAGING:   All imaging reviewed from admission to present day    IMPRESSION:  # AYAN   - non-oliguric   - Etiology likely 2/2 ATN from NSAID use  #CKD Stage 3  - Etiology likely 2/2 CRS/Ischemic injury chronic  - BCr unclear, but in 2020 ~ 1.8-2  #Hyponatremia  - Improved  #Hyperkalemia-potassium 6.7 secondary to AYAN, no peaked T waves  #Metabolic alkalosis-secondary to CKD  - Elevated BUN  #Hyperphosphatemia  #Polysubstance abuse-Per primary team consider methadone to avoid withdrawal  #Chest pain-flat but elevated troponins, work-up as per primary team  #HFrEF  #Hepatitis C-status posttreatment    PLAN:  - No indication for KRT  - Daily labs  - Dose all meds per eGFR < 15  - Cardiac work up per primary  - Avoid NSAIDs/nephrotoxins
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
## 2023-10-28 NOTE — PROGRESS NOTES
Bedside report received from night RN; assumed pt care. Pt assessment complete. Pt AxO4 Reviewed plan of care with pt. Pt tele monitored with sinus rhythm at 72 bpm. Chart and labs reviewed. Bed in lowest position, and 2 side rails up. Pt educated on call light; call light with in reach. Patient reports meth and heroin abuse and reports being two days sober.

## 2023-10-28 NOTE — PROGRESS NOTES
Handoff report received from day shift nurse. Pt care assumed. Pt is currently resting in bed. POC discussed with Pt and Pt verbalizes no questions at this time. Pt is AAOx4, on RA, on Tele monitoring, and VSS. Call light and belongings within reach, bed in lowest and locked position, and Pt educated on use of call light. Will continue to monitor.

## 2023-10-28 NOTE — DISCHARGE INSTR - WOUND CARE
LEFT LEG DRESSING CARE    1) Remove old dressing.   2) Cleanse wound with wound cleanser and gauze or soap and water. Pat dry.   3) Cut a piece of Xeroform (yellow Petrolatum) and apply over open wound.   4) Secure in place with large bandaid.  5) Change daily and as needed per dislodgement and/or drainage/saturation.

## 2023-10-28 NOTE — PROGRESS NOTES
"Pt arrives ambulatory to triage w/ c/o ha, fever, nausea, myalgias. Denies cough, cp, sob. Reports Monday she woke up from sleep \"feeling the worst I've ever felt\" w/ a ha and cold sweats. At-home covid tests all negative; states granddaughter had Covid 2 weeks ago. Pt states she is primary caregiver for  w/ dementia and that she wakes up multiple times w/ him during the night- stating she thinks she's \"worn out\"      " 4 Eyes Skin Assessment Completed by DOMINICK Romero and MALISSA Neal.    Head WDL  Ears WDL  Nose WDL  Mouth WDL  Neck WDL  Breast/Chest WDL  Shoulder Blades WDL  Spine WDL severe scoliosis  (R) Arm/Elbow/Hand WDL  (L) Arm/Elbow/Hand WDL  Abdomen WDL  Groin WDL  Scrotum/Coccyx/Buttocks WDL  (R) Leg - purple discoloration, shiny  (L) Leg 2 Small open wounds with small drainage - RLE purple discoloration, shiny, dry scabs  (R) Heel/Foot/Toe WDL  (L) Heel/Foot/Toe WDL          Devices In Places Tele Box      Interventions In Place Pillows    Possible Skin Injury Yes    Pictures Uploaded Into Epic Yes  Wound Consult Placed Yes  RN Wound Prevention Protocol Ordered Yes

## 2023-10-29 VITALS
HEART RATE: 88 BPM | DIASTOLIC BLOOD PRESSURE: 63 MMHG | SYSTOLIC BLOOD PRESSURE: 94 MMHG | RESPIRATION RATE: 16 BRPM | HEIGHT: 61 IN | WEIGHT: 138.45 LBS | OXYGEN SATURATION: 90 % | TEMPERATURE: 97.9 F | BODY MASS INDEX: 26.14 KG/M2

## 2023-10-29 LAB
ANION GAP SERPL CALC-SCNC: 13 MMOL/L (ref 7–16)
BUN SERPL-MCNC: 82 MG/DL (ref 8–22)
CALCIUM SERPL-MCNC: 9.5 MG/DL (ref 8.5–10.5)
CHLORIDE SERPL-SCNC: 98 MMOL/L (ref 96–112)
CO2 SERPL-SCNC: 22 MMOL/L (ref 20–33)
CREAT SERPL-MCNC: 2.25 MG/DL (ref 0.5–1.4)
ERYTHROCYTE [DISTWIDTH] IN BLOOD BY AUTOMATED COUNT: 41.2 FL (ref 35.9–50)
GFR SERPLBLD CREATININE-BSD FMLA CKD-EPI: 32 ML/MIN/1.73 M 2
GLUCOSE SERPL-MCNC: 152 MG/DL (ref 65–99)
HCT VFR BLD AUTO: 43 % (ref 42–52)
HGB BLD-MCNC: 14.7 G/DL (ref 14–18)
MCH RBC QN AUTO: 29.2 PG (ref 27–33)
MCHC RBC AUTO-ENTMCNC: 34.2 G/DL (ref 32.3–36.5)
MCV RBC AUTO: 85.3 FL (ref 81.4–97.8)
PLATELET # BLD AUTO: 249 K/UL (ref 164–446)
PMV BLD AUTO: 9.4 FL (ref 9–12.9)
POTASSIUM SERPL-SCNC: 4.2 MMOL/L (ref 3.6–5.5)
RBC # BLD AUTO: 5.04 M/UL (ref 4.7–6.1)
SODIUM SERPL-SCNC: 133 MMOL/L (ref 135–145)
WBC # BLD AUTO: 8.7 K/UL (ref 4.8–10.8)

## 2023-10-29 PROCEDURE — 700102 HCHG RX REV CODE 250 W/ 637 OVERRIDE(OP): Performed by: STUDENT IN AN ORGANIZED HEALTH CARE EDUCATION/TRAINING PROGRAM

## 2023-10-29 PROCEDURE — 85027 COMPLETE CBC AUTOMATED: CPT

## 2023-10-29 PROCEDURE — 99239 HOSP IP/OBS DSCHRG MGMT >30: CPT | Performed by: INTERNAL MEDICINE

## 2023-10-29 PROCEDURE — A9270 NON-COVERED ITEM OR SERVICE: HCPCS | Performed by: STUDENT IN AN ORGANIZED HEALTH CARE EDUCATION/TRAINING PROGRAM

## 2023-10-29 PROCEDURE — 80048 BASIC METABOLIC PNL TOTAL CA: CPT

## 2023-10-29 PROCEDURE — 36415 COLL VENOUS BLD VENIPUNCTURE: CPT

## 2023-10-29 PROCEDURE — 700111 HCHG RX REV CODE 636 W/ 250 OVERRIDE (IP): Performed by: STUDENT IN AN ORGANIZED HEALTH CARE EDUCATION/TRAINING PROGRAM

## 2023-10-29 RX ADMIN — HEPARIN SODIUM 5000 UNITS: 5000 INJECTION, SOLUTION INTRAVENOUS; SUBCUTANEOUS at 05:43

## 2023-10-29 RX ADMIN — DOCUSATE SODIUM 50 MG AND SENNOSIDES 8.6 MG 2 TABLET: 8.6; 5 TABLET, FILM COATED ORAL at 05:43

## 2023-10-29 RX ADMIN — SODIUM BICARBONATE 650 MG: 650 TABLET ORAL at 08:40

## 2023-10-29 RX ADMIN — NICOTINE TRANSDERMAL SYSTEM 21 MG: 21 PATCH, EXTENDED RELEASE TRANSDERMAL at 05:44

## 2023-10-29 RX ADMIN — METHADONE HYDROCHLORIDE 20 MG: 10 TABLET ORAL at 05:43

## 2023-10-29 ASSESSMENT — FIBROSIS 4 INDEX: FIB4 SCORE: 1.52

## 2023-10-29 NOTE — PROGRESS NOTES
Hospital Medicine Daily Progress Note    Date of Service  10/29/2023    Chief Complaint  Eugenio Telles is a 63 y.o. male admitted 10/27/2023 with chest pain     Hospital Course  This is a 63 y.o. male with a history of hypertension, daily heroin use, nicotine dependence, CHFrEF, CKD3, who presented 10/27/2023 with chest pain for 3 weeks.  Patient reported the chest pain for 3 weeks while he was coughing or moving his body, intermittent, self resolved, no radiation, not associated with nausea vomiting or shortness of breath  In ER he was found to have acute on chronic renal failure and also hyperkalemia with potassium of 6.7.  Patient admitted for further treatment and nephrology was consulted     Interval Problem Update  Patient seen an examined, afebrile denies any nausea or vomiting, still with some chest pain with cough.   Potassium 5.1 today,, and creatinine 2.77  Nephrology following appreciate rec.   10/29: Patient resting in bed, no nausea or vomiting  Creatinine 2.2 today hyperkalemia has improved  Nephrology following appreciate rec.    I have discussed this patient's plan of care and discharge plan at IDT rounds today with Case Management, Nursing, Nursing leadership, and other members of the IDT team.    Consultants/Specialty  nephrology    Code Status  Full Code    Disposition  The patient is not medically cleared for discharge to home or a post-acute facility.      I have placed the appropriate orders for post-discharge needs.    Review of Systems  ROS     Physical Exam  Temp:  [36.3 °C (97.3 °F)-36.8 °C (98.2 °F)] 36.6 °C (97.9 °F)  Pulse:  [74-88] 88  Resp:  [14-16] 16  BP: ()/(63-81) 94/63  SpO2:  [87 %-95 %] 90 %    Physical Exam    Fluids    Intake/Output Summary (Last 24 hours) at 10/29/2023 1257  Last data filed at 10/29/2023 0800  Gross per 24 hour   Intake 120 ml   Output 750 ml   Net -630 ml         Laboratory  Recent Labs     10/27/23  0529 10/28/23  0201 10/29/23  0418   WBC 7.9  9.2 8.7   RBC 5.16 5.11 5.04   HEMOGLOBIN 15.4 15.5 14.7   HEMATOCRIT 44.6 44.1 43.0   MCV 86.4 86.3 85.3   MCH 29.8 30.3 29.2   MCHC 34.5 35.1 34.2   RDW 41.7 41.5 41.2   PLATELETCT 245 200 249   MPV 9.5 9.3 9.4       Recent Labs     10/27/23  1816 10/28/23  0201 10/29/23  0418   SODIUM 134* 134* 133*   POTASSIUM 5.0 5.1 4.2   CHLORIDE 99 99 98   CO2 22 21 22   GLUCOSE 115* 97 152*   BUN 91* 90* 82*   CREATININE 2.99* 2.77* 2.25*   CALCIUM 9.8 9.9 9.5                     Imaging  EC-ECHOCARDIOGRAM COMPLETE W/O CONT   Final Result      US-RENAL   Final Result      1.  Limited exam with poor visualization of both kidneys. No gross hydronephrosis.      DX-CHEST-PORTABLE (1 VIEW)   Final Result         1.  Bibasilar opacities, greater on the left, appearance favoring infiltrates.           Assessment/Plan  * AYAN (acute kidney injury) ([facility])- (present on admission)  Assessment & Plan  AYAN on CKD 3  Creatinine 2.77 today has received one time dose of IV lasix yesterday   Renal ultrasound negative for acute pathology or hydronephrosis  Nephrology following appreciate rec.     Dependence on nicotine from cigarettes  Assessment & Plan  Smoke cessation education was given at bedside  I ordered a nicotine patch    HFrEF (heart failure with reduced ejection fraction) ([facility])  Assessment & Plan  Reported a history of HFrEF.  He cannot remember what was his last EF  Continue home Coreg  Hold home spironolactone, lisinopril, and Lasix due to AYAN  Stable, no acute exacerbation    I ordered an echo    Hyperkalemia  Assessment & Plan  Potassium 6.7  Received hyperkalemia treatment  Potassium 5.1 today  Close monitoring     Other chest pain  Assessment & Plan  chest pain for 3 weeks while he was coughing or moving his body, from chest, intermittent, self resolved, no radiation, not associated with nausea vomiting or shortness of breath.  EKG no acute ischemic or ST changes.     Likely MSK related  No need for further work-up    Heroin  dependence (HCC)  Assessment & Plan  Patient reported daily iv heroin use for 20 years, no intention to quit.  He stated he use a maintenance dose.   His urine drug screen is positive for amphetamine, opiates and oxycodone          VTE prophylaxis: Oz [place]DLE[place]    I have performed a physical exam and reviewed and updated ROS and Plan today (10/29/2023). In review of yesterday's note (10/28/2023), there are no changes except as documented above.

## 2023-10-29 NOTE — CARE PLAN
The patient is Watcher - Medium risk of patient condition declining or worsening    Shift Goals  Clinical Goals: Electrolyte stability, safety  Patient Goals: To go home, resolution of chest pain  Family Goals: TUA    Progress made toward(s) clinical / shift goals:    Problem: Pain - Standard  Goal: Alleviation of pain or a reduction in pain to the patient’s comfort goal  Outcome: Progressing     Problem: Knowledge Deficit - Standard  Goal: Patient and family/care givers will demonstrate understanding of plan of care, disease process/condition, diagnostic tests and medications  Outcome: Progressing       Patient is not progressing towards the following goals:    Patient is progressing towards goals.

## 2023-10-29 NOTE — CARE PLAN
The patient is Stable - Low risk of patient condition declining or worsening    Shift Goals  Clinical Goals: Monitor labs and VS  Patient Goals: To go home, resolution of chest pain  Family Goals: TUA    Progress made toward(s) clinical / shift goals:  yes    Problem: Pain - Standard  Goal: Alleviation of pain or a reduction in pain to the patient’s comfort goal  Outcome: Progressing  Flowsheets (Taken 10/29/2023 0318)  Non Verbal Scale: Calm     Problem: Fall Risk  Goal: Patient will remain free from falls  Outcome: Progressing       Patient is not progressing towards the following goals:

## 2023-10-29 NOTE — PROGRESS NOTES
"[facility] -  PROGRESS NOTE               Author: Doreen Eubanks M.D. Date & Time: 10/29/2023  2:42 PM     HPI:  63 y.o. male with CKD stage IIIb, hepatitis C status post treatment, heart failure with reduced ejection fraction (EF of 30 to 35% greater than 3 years old), polysubstance abuse, hypertension who presents today with a 3-week history of worsening chest pain.  Patient states he has had slowly progressing worsening substernal chest pain without radiation to the shoulder and neck that he describes as a sharp pain, not positional but worth with us with breathing in.  Patient does not remember the exact amount but he does know that he has been taking Advil daily for the last 3 weeks in an effort to help combat the chest pain, patient also endorses heroin use approximately 6 hours ago, patient notes he used to follow-up with cardiology and nephrology but has not done so since approximately 2020, given his addiction and the number of medical appointments he needed to make were not congruent with his lifestyle.  Patient has been staying well-hydrated drinking at least 3 water bottles standard size per day.  Patient denies any worsening dyspnea on exertion any fevers, chills, night sweats, cough, sputum production, dysuria, hematuria, or frequency/urgency.  Rest of 14 point review of systems negative septa as above below.     No F/C/N/V/CP/SOB.  No melena, hematochezia, hematemesis.  No HA, visual changes, or abdominal pain.    DAILY NEPHROLOGY SUMMARY:  10/27: Consult done  10/28: NAEO, feels better, still with chest pain  10/29: NAEO, feels better, no complaints    REVIEW OF SYSTEMS:    10 point ROS reviewed and is as per HPI/daily summary or otherwise negative    PMH/PSH/SH/FH:   Reviewed and unchanged since admission note    CURRENT MEDICATIONS:   Reviewed from admission to present day    VS:  BP 94/63   Pulse 88   Temp 36.6 °C (97.9 °F) (Temporal)   Resp 16   Ht 1.549 m (5' 1\")  "  Wt 62.8 kg (138 lb 7.2 oz)   SpO2 90%   BMI 26.16 kg/m²     Physical Exam  Nursing note reviewed.   Constitutional:       Appearance: Normal appearance.   Eyes:      General: No scleral icterus.  Cardiovascular:      Comments: No edema  Pulmonary:      Effort: Pulmonary effort is normal.   Abdominal:      General: There is no distension.   Musculoskeletal:         General: Tenderness (Chest diffusely on palpation) present. No deformity.   Skin:     Findings: No rash.   Neurological:      Mental Status: He is alert.   Psychiatric:         Behavior: Behavior is cooperative.         Fluids:  In: 120 [P.O.:120]  Out: 750     LABS:  Recent Labs     10/27/23  1816 10/28/23  0201 10/29/23  0418   SODIUM 134* 134* 133*   POTASSIUM 5.0 5.1 4.2   CHLORIDE 99 99 98   CO2 22 21 22   GLUCOSE 115* 97 152*   BUN 91* 90* 82*   CREATININE 2.99* 2.77* 2.25*   CALCIUM 9.8 9.9 9.5         IMAGING:   All imaging reviewed from admission to present day    IMPRESSION:  # AYAN   - non-oliguric   - Etiology likely 2/2 ATN from NSAID use  #CKD Stage 3  - Etiology likely 2/2 CRS/Ischemic injury chronic  - BCr unclear, but in 2020 ~ 1.8-2  #Hyponatremia  - Improved  #Hyperkalemia-potassium 6.7 secondary to AYAN, no peaked T waves  #Metabolic alkalosis-secondary to CKD  - Elevated BUN  #Hyperphosphatemia  #Polysubstance abuse-Per primary team consider methadone to avoid withdrawal  #Chest pain-flat but elevated troponins, work-up as per primary team  #HFrEF  #Hepatitis C-status posttreatment    PLAN:  - No indication for KRT  - Daily labs  - Dose all meds per eGFR < 15  - Cardiac work up per primary  - Avoid NSAIDs/nephrotoxins  - Ok to transition to OP care, will arrange FU in our clinic on DC

## 2023-10-29 NOTE — PROGRESS NOTES
Bedside report received from NOC RN. Assumed care of pt. Pt awake, laying in bed. A/Ox4, VSS. No concerns, complaints or distress. Pt educated to call before getting out of bed. POC reviewed and white board updated. Tele box on. SR 78 on the monitor. Call light in reach. Bed locked in lowest position with 2 upper bed rails up. Bed alarm on.

## 2023-10-30 ENCOUNTER — TELEPHONE (OUTPATIENT)
Dept: HEALTH INFORMATION MANAGEMENT | Facility: OTHER | Age: 63
End: 2023-10-30
Payer: MEDICAID

## 2023-10-30 NOTE — PROGRESS NOTES
Patient discharged. A&O x 4. Discharge instructions, personal belongings in possession of a patient. PIV and tele monitor removed.  Copy of discharge instructions in the patient chart, signed and reviewed. Patient verbalizes the understanding of the discharge instructions. Questions / concerns addressed prior to leaving the unit. Patient is instructed to follow up with PCP.Transported via wheelchair.  Patient is discharged to home. Family is present.

## 2023-10-30 NOTE — DISCHARGE SUMMARY
Discharge Summary    CHIEF COMPLAINT ON ADMISSION  Chief Complaint   Patient presents with    Chest Pain     C/o sternal pain x 3 wks, progressively worsening. Rate pain 7/10 w/ movement. Denies SOB. Hx CHF.        Reason for Admission  pain in sternum     Admission Date  10/27/2023    CODE STATUS  Prior    HPI & HOSPITAL COURSE  This is a 63 y.o. male a history of hypertension, daily heroin use, nicotine dependence, CHFrEF, CKD3, who presented 10/27/2023 with chest pain for 3 weeks.  Patient reported the chest pain for 3 weeks while he was coughing or moving his body, intermittent, self resolved, no radiation, not associated with nausea vomiting or shortness of breath  In ER he was found to have acute on chronic renal failure and also hyperkalemia with potassium of 6.7.  Patient was admitted for further treatment and was started on lokelma and also received one time dose of IV lasix. Nephrology was also consulted. His hyperkalemia resolves and AYAN slowly improving. Per nephrology will need to follow up as outpatient   He also had an echo done showing EF of 65%   Patient now doing better  Will discharge patient home today    The patient met 2-midnight criteria for an inpatient stay at the time of discharge.    Discharge Date  10/29/2023    FOLLOW UP ITEMS POST DISCHARGE  Nephrology     DISCHARGE DIAGNOSES  Principal Problem:    AYAN (acute kidney injury) (HCC) (POA: Yes)  Active Problems:    Heroin dependence (HCC) (POA: Unknown)    Other chest pain (POA: Unknown)    Hyperkalemia (POA: Unknown)    HFrEF (heart failure with reduced ejection fraction) (HCC) (POA: Unknown)    Dependence on nicotine from cigarettes (POA: Unknown)  Resolved Problems:    * No resolved hospital problems. *      FOLLOW UP  Future Appointments   Date Time Provider Department Center   11/6/2023  9:00 AM Trell Marlow R.N. [facility] 2nd .     W[facility] Center  [address] [facility] [address]  [facility] [id number] [phone]  Follow up  For Left Leg  wound re-check    Damian Garcia M.D.  [address]  [facility] [address]  [place] [id number] [phone]    Schedule an appointment as soon as possible for a visit in 2 week(s)  for hospital follow up      MEDICATIONS ON DISCHARGE     Medication List        CONTINUE taking these medications        Instructions   carvedilol 6.25 MG Tabs  Commonly known as: Coreg   Take 6.25 mg by mouth every day.  Dose: 6.25 mg     lisinopril 10 MG Tabs  Commonly known as: Prinivil   Take 10 mg by mouth every day.  Dose: 10 mg            STOP taking these medications      furosemide 20 MG Tabs  Commonly known as: Lasix     ibuprofen 200 MG Tabs  Commonly known as: Motrin     spironolactone 25 MG Tabs  Commonly known as: Aldactone              Allergies  No Known Allergies    DIET  No orders of the defined types were placed in this encounter.      ACTIVITY  As tolerated.  Weight bearing as tolerated    CONSULTATIONS  Nephrology     PROCEDURES  None     LABORATORY  Lab Results   Component Value Date    SODIUM 133 (L) 10/29/2023    POTASSIUM 4.2 10/29/2023    CHLORIDE 98 10/29/2023    CO2 22 10/29/2023    GLUCOSE 152 (H) 10/29/2023    BUN 82 (H) 10/29/2023    CREATININE 2.25 (H) 10/29/2023        Lab Results   Component Value Date    WBC 8.7 10/29/2023    HEMOGLOBIN 14.7 10/29/2023    HEMATOCRIT 43.0 10/29/2023    PLATELETCT 249 10/29/2023        Total time of the discharge process exceeds 35 minutes.

## 2023-11-07 NOTE — DOCUMENTATION QUERY
[facility]                                                                       Query Response Note      PATIENT:               FORD LEDEZMA  ACCT #:                  [id number]  MRN:                     [id number]  :                      1960  ADMIT DATE:       10/27/2023 5:09 AM  DISCH DATE:        10/29/2023 5:02 PM  RESPONDING  PROVIDER #:        [id number]           QUERY TEXT:    Based on the above findings can acute kidney injury be further specified.    The patient's Clinical Indicators include:  Findings: 10/28 PN:  AYAN  non-oliguric  Etiology likely 2/2 ATN from NSAID use  Etiology likely 2/2 CRS/Ischemic injury chronic BCr unclear, but in  ~ 1.8-2    10/27 Neph Consult:  AYAN non-oliguric on CKD presumed secondary to drug-induced AYAN secondary to NSAID injury, versus worsening CKD versus ATN appears dry     10/27-10/29 Labs: Creatinine 10/27 Creatinine 3.61, 10/28 Creatinine 2.77, 10/29 Creatinine 2.25    Treatment Nephrology consult IV fluids, 80mg IV lasix x1 start lokelma     Risk Factors: nonsteroidal antiinflammatory drug use history of CKD stage 3    Shalini Nix RN BSN  Clinical Documentation   [email or web address]  Connect via [email or web address]  Options provided:   -- Acute Kidney Injury/Acute Renal Failure with Acute Tubular Necrosis   -- Acute Kidney Injury on CKD III only no Acute tubular necrosis   -- Other explanation, (please specify other explanation)      Query created by: Shalini Donaldson on 2023 10:46 AM    RESPONSE TEXT:    Acute Kidney Injury/Acute Renal Failure with Acute Tubular Necrosis          Electronically signed by:  MEDARDO DURANT MD 2023 7:09 AM

## 2023-12-04 ENCOUNTER — HOSPITAL ENCOUNTER (OUTPATIENT)
Facility: MEDICAL CENTER | Age: 63
End: 2023-12-04
Attending: NURSE PRACTITIONER
Payer: MEDICAID

## 2023-12-04 ENCOUNTER — HOSPITAL ENCOUNTER (OUTPATIENT)
Dept: LAB | Facility: MEDICAL CENTER | Age: 63
End: 2023-12-04
Attending: NURSE PRACTITIONER
Payer: MEDICAID

## 2023-12-04 LAB
25(OH)D3 SERPL-MCNC: 47 NG/ML (ref 30–100)
ALBUMIN SERPL BCP-MCNC: 4 G/DL (ref 3.2–4.9)
ALBUMIN/GLOB SERPL: 1.1 G/DL
ALP SERPL-CCNC: 96 U/L (ref 30–99)
ALT SERPL-CCNC: 5 U/L (ref 2–50)
ANION GAP SERPL CALC-SCNC: 12 MMOL/L (ref 7–16)
AST SERPL-CCNC: 10 U/L (ref 12–45)
BASOPHILS # BLD AUTO: 0.7 % (ref 0–1.8)
BASOPHILS # BLD: 0.05 K/UL (ref 0–0.12)
BILIRUB SERPL-MCNC: 0.4 MG/DL (ref 0.1–1.5)
BUN SERPL-MCNC: 23 MG/DL (ref 8–22)
CALCIUM ALBUM COR SERPL-MCNC: 9.7 MG/DL (ref 8.5–10.5)
CALCIUM SERPL-MCNC: 9.7 MG/DL (ref 8.5–10.5)
CHLORIDE SERPL-SCNC: 105 MMOL/L (ref 96–112)
CO2 SERPL-SCNC: 22 MMOL/L (ref 20–33)
CREAT SERPL-MCNC: 1.43 MG/DL (ref 0.5–1.4)
EOSINOPHIL # BLD AUTO: 0.15 K/UL (ref 0–0.51)
EOSINOPHIL NFR BLD: 2.1 % (ref 0–6.9)
ERYTHROCYTE [DISTWIDTH] IN BLOOD BY AUTOMATED COUNT: 46.5 FL (ref 35.9–50)
FERRITIN SERPL-MCNC: 71.2 NG/ML (ref 22–322)
GFR SERPLBLD CREATININE-BSD FMLA CKD-EPI: 55 ML/MIN/1.73 M 2
GLOBULIN SER CALC-MCNC: 3.5 G/DL (ref 1.9–3.5)
GLUCOSE SERPL-MCNC: 109 MG/DL (ref 65–99)
HCT VFR BLD AUTO: 36.5 % (ref 42–52)
HGB BLD-MCNC: 12.5 G/DL (ref 14–18)
IMM GRANULOCYTES # BLD AUTO: 0.01 K/UL (ref 0–0.11)
IMM GRANULOCYTES NFR BLD AUTO: 0.1 % (ref 0–0.9)
IRON SATN MFR SERPL: 15 % (ref 15–55)
IRON SERPL-MCNC: 45 UG/DL (ref 50–180)
LYMPHOCYTES # BLD AUTO: 1.19 K/UL (ref 1–4.8)
LYMPHOCYTES NFR BLD: 16.9 % (ref 22–41)
MAGNESIUM SERPL-MCNC: 1.9 MG/DL (ref 1.5–2.5)
MCH RBC QN AUTO: 29.8 PG (ref 27–33)
MCHC RBC AUTO-ENTMCNC: 34.2 G/DL (ref 32.3–36.5)
MCV RBC AUTO: 86.9 FL (ref 81.4–97.8)
MONOCYTES # BLD AUTO: 0.48 K/UL (ref 0–0.85)
MONOCYTES NFR BLD AUTO: 6.8 % (ref 0–13.4)
NEUTROPHILS # BLD AUTO: 5.17 K/UL (ref 1.82–7.42)
NEUTROPHILS NFR BLD: 73.4 % (ref 44–72)
NRBC # BLD AUTO: 0 K/UL
NRBC BLD-RTO: 0 /100 WBC (ref 0–0.2)
PHOSPHATE SERPL-MCNC: 2.7 MG/DL (ref 2.5–4.5)
PLATELET # BLD AUTO: 237 K/UL (ref 164–446)
PMV BLD AUTO: 9.2 FL (ref 9–12.9)
POTASSIUM SERPL-SCNC: 4.5 MMOL/L (ref 3.6–5.5)
PROT SERPL-MCNC: 7.5 G/DL (ref 6–8.2)
PTH-INTACT SERPL-MCNC: 61.8 PG/ML (ref 14–72)
RBC # BLD AUTO: 4.2 M/UL (ref 4.7–6.1)
SODIUM SERPL-SCNC: 139 MMOL/L (ref 135–145)
TIBC SERPL-MCNC: 310 UG/DL (ref 250–450)
UIBC SERPL-MCNC: 265 UG/DL (ref 110–370)
URATE SERPL-MCNC: 7.4 MG/DL (ref 2.5–8.3)
WBC # BLD AUTO: 7.1 K/UL (ref 4.8–10.8)

## 2023-12-04 PROCEDURE — 84156 ASSAY OF PROTEIN URINE: CPT

## 2023-12-04 PROCEDURE — 82728 ASSAY OF FERRITIN: CPT

## 2023-12-04 PROCEDURE — 85025 COMPLETE CBC W/AUTO DIFF WBC: CPT

## 2023-12-04 PROCEDURE — 82306 VITAMIN D 25 HYDROXY: CPT

## 2023-12-04 PROCEDURE — 82043 UR ALBUMIN QUANTITATIVE: CPT

## 2023-12-04 PROCEDURE — 84100 ASSAY OF PHOSPHORUS: CPT

## 2023-12-04 PROCEDURE — 83550 IRON BINDING TEST: CPT

## 2023-12-04 PROCEDURE — 81003 URINALYSIS AUTO W/O SCOPE: CPT

## 2023-12-04 PROCEDURE — 83735 ASSAY OF MAGNESIUM: CPT

## 2023-12-04 PROCEDURE — 80053 COMPREHEN METABOLIC PANEL: CPT

## 2023-12-04 PROCEDURE — 84550 ASSAY OF BLOOD/URIC ACID: CPT

## 2023-12-04 PROCEDURE — 83970 ASSAY OF PARATHORMONE: CPT

## 2023-12-04 PROCEDURE — 82570 ASSAY OF URINE CREATININE: CPT

## 2023-12-04 PROCEDURE — 83540 ASSAY OF IRON: CPT

## 2023-12-04 PROCEDURE — 36415 COLL VENOUS BLD VENIPUNCTURE: CPT

## 2023-12-06 LAB
APPEARANCE UR: CLEAR
BILIRUB UR QL STRIP.AUTO: NEGATIVE
COLOR UR: YELLOW
GLUCOSE UR STRIP.AUTO-MCNC: NEGATIVE MG/DL
KETONES UR STRIP.AUTO-MCNC: NEGATIVE MG/DL
LEUKOCYTE ESTERASE UR QL STRIP.AUTO: NEGATIVE
MICRO URNS: NORMAL
NITRITE UR QL STRIP.AUTO: NEGATIVE
PH UR STRIP.AUTO: 5.5 [PH] (ref 5–8)
PROT UR QL STRIP: NEGATIVE MG/DL
RBC UR QL AUTO: NEGATIVE
SP GR UR STRIP.AUTO: 1.02
UROBILINOGEN UR STRIP.AUTO-MCNC: 0.2 MG/DL

## 2023-12-07 LAB
CREAT UR-MCNC: 125.45 MG/DL
CREAT UR-MCNC: 134.82 MG/DL
MICROALBUMIN UR-MCNC: 1.2 MG/DL
MICROALBUMIN/CREAT UR: 9 MG/G (ref 0–30)
PROT UR-MCNC: 12 MG/DL (ref 0–15)
PROT/CREAT UR: 96 MG/G (ref 15–68)

## 2024-08-21 ENCOUNTER — NON-PROVIDER VISIT (OUTPATIENT)
Dept: WOUND CARE | Facility: MEDICAL CENTER | Age: 64
End: 2024-08-21
Attending: FAMILY MEDICINE
Payer: MEDICAID

## 2024-08-21 PROCEDURE — 99211 OFF/OP EST MAY X REQ PHY/QHP: CPT

## 2024-08-21 PROCEDURE — 97597 DBRDMT OPN WND 1ST 20 CM/<: CPT

## 2024-08-21 NOTE — PATIENT INSTRUCTIONS
-Keep your wound dressing clean, dry, and intact. Only change dressing if it's over saturated, soiled or falls off.     -Change your dressing if it becomes soiled, soaked, or falls off.    -Should you experience any significant changes in your wound(s), such as infection (redness, swelling, localized heat, increased pain, fever > 101 F, chills) or have any questions regarding your home care instructions, please contact the wound center at [phone]. If after hours, contact your primary care physician or go to the hospital emergency room.     If you are admitted to any hospital, you will need a new referral to come back to the wound clinic and any scheduled appointments that you already have, may be cancelled.

## 2024-08-21 NOTE — PROGRESS NOTES
Advanced Wound Care   [facility]   [address]   [address]   [place] [address]   [phone] Fax: [phone]        Duration of Supply Order: 90 Days  Dispense as written.    Wound 08/21/24 Pretibial Left Left Anterior LE Cluster (Active)   Wound Image   08/21/24 1600   Site Assessment Red;Yellow 08/21/24 1600   Periwound Assessment Dry;Intact;Edema;Scar tissue 08/21/24 1600   Margins Epibole (rolled edges) 08/21/24 1600   Closure Secondary intention 08/21/24 1600   Drainage Amount Moderate 08/21/24 1600   Drainage Description Serosanguineous 08/21/24 1600   Treatments Cleansed;Topical Lidocaine;CSWD - Conservative Sharp Wound Debridement;Site care 08/21/24 1600   Wound Cleansing Hypochlorus Acid 08/21/24 1600   Periwound Protectant Moisture Barrier 08/21/24 1600   Dressing Changed New 08/21/24 1600   Dressing Cleansing/Solutions Not Applicable 08/21/24 1600   Dressing Options Hydrofiber Silver;Absorbent Abdominal Pad;Dry Roll Gauze;Hypafix Tape;Tubigrip 08/21/24 1600   Dressing Change/Treatment Frequency Every 72 hrs, and As Needed 08/21/24 1600   Wound Team Following Weekly 08/21/24 1600   Wound Length (cm) 16.2 cm 08/21/24 1600   Wound Width (cm) 5.5 cm 08/21/24 1600   Wound Depth (cm) 0.3 cm 08/21/24 1600   Wound Surface Area (cm^2) 89.1 cm^2 08/21/24 1600   Wound Volume (cm^3) 26.73 cm^3 08/21/24 1600   Post-Procedure Length (cm) 16.2 cm 08/21/24 1600   Post-Procedure Width (cm) 5.5 cm 08/21/24 1600   Post-Procedure Depth (cm) 0.3 cm 08/21/24 1600   Post-Procedure Surface Area (cm^2) 89.1 cm^2 08/21/24 1600   Post-Procedure Volume (cm^3) 26.73 cm^3 08/21/24 1600   Tunneling (cm) 0 cm 08/21/24 1600   Undermining (cm) 0 cm 08/21/24 1600   Wound Odor None 08/21/24 1600   Exposed Structures None 08/21/24 1600       Wound 08/21/24 Pretibial Proximal;Lateral Left left lateral lower extremity (Active)   Wound Image   08/21/24 1600   Site Assessment Red 08/21/24 1600    Periwound Assessment Dry;Intact;Edema;Scar tissue 08/21/24 1600   Margins Epibole (rolled edges);Unattached edges 08/21/24 1600   Closure Secondary intention 08/21/24 1600   Drainage Amount Moderate 08/21/24 1600   Drainage Description Serosanguineous 08/21/24 1600   Treatments Cleansed;Topical Lidocaine;CSWD - Conservative Sharp Wound Debridement;Site care 08/21/24 1600   Wound Cleansing Hypochlorus Acid 08/21/24 1600   Periwound Protectant Moisture Barrier 08/21/24 1600   Dressing Changed New 08/21/24 1600   Dressing Cleansing/Solutions Not Applicable 08/21/24 1600   Dressing Options Hydrofiber Silver;Nonadhesive Foam;Dry Roll Gauze;Hypafix Tape;Tubigrip 08/21/24 1600   Dressing Change/Treatment Frequency Every 72 hrs, and As Needed 08/21/24 1600   Wound Team Following Weekly 08/21/24 1600   Wound Length (cm) 1.2 cm 08/21/24 1600   Wound Width (cm) 1.1 cm 08/21/24 1600   Wound Depth (cm) 0.2 cm 08/21/24 1600   Wound Surface Area (cm^2) 1.32 cm^2 08/21/24 1600   Wound Volume (cm^3) 0.264 cm^3 08/21/24 1600   Post-Procedure Length (cm) 1.2 cm 08/21/24 1600   Post-Procedure Width (cm) 1.1 cm 08/21/24 1600   Post-Procedure Depth (cm) 0.2 cm 08/21/24 1600   Post-Procedure Surface Area (cm^2) 1.32 cm^2 08/21/24 1600   Post-Procedure Volume (cm^3) 0.264 cm^3 08/21/24 1600   Tunneling (cm) 0 cm 08/21/24 1600   Undermining (cm) 0.1 cm 08/21/24 1600   Undermining of Wound, 1st Location From 11 o'clock;To 5 o'clock 08/21/24 1600   Wound Odor None 08/21/24 1600   Exposed Structures None 08/21/24 1600       PROCEDURE:   2% topical Lidocaine applied prior to debridement with ~5 minute dwell time. Conservative sharp wound debridement using curette to remove < 20 cm2 nonviable tissue from left anterior LE cluster and left lateral LE wound beds. Patient tolerated well; denied pain.      I agree with current plan of care.

## 2024-08-21 NOTE — CERTIFICATION
Non Provider Encounter- Lower Extremity Ulcer    HISTORY OF PRESENT ILLNESS  Wound History:    START OF CARE IN CLINIC: 08/21/24    REFERRING PROVIDER: Damian Garcia M.D.   LOCATION: Left anterior LE cluster and Left lateral LE.   HISTORY: Patient is a 63 year old male with multiple ulcers to his left lower extremity. Patient states the wounds have been present for approximately 3 years and states they started because his shoes rubbed his skin raw. Patient has been keeping the wound covered with gauze and adhesive dressings, referred to the wound clinic for continued wound care by his PCP.     Pertinent Medical History: patient has a history of heroin use.     TOBACCO USE: current    MOST RECENT VASCULAR STUDIES: None in Epic.     Patient allergies and medications reviewed via Epic.     WOUND ASSESSMENT      Wound 08/21/24 Pretibial Left Left Anterior LE Cluster (Active)   Wound Image   08/21/24 1600   Site Assessment Red;Yellow 08/21/24 1600   Periwound Assessment Dry;Intact;Edema;Scar tissue 08/21/24 1600   Margins Epibole (rolled edges) 08/21/24 1600   Closure Secondary intention 08/21/24 1600   Drainage Amount Moderate 08/21/24 1600   Drainage Description Serosanguineous 08/21/24 1600   Treatments Cleansed;Topical Lidocaine;CSWD - Conservative Sharp Wound Debridement;Site care 08/21/24 1600   Wound Cleansing Hypochlorus Acid 08/21/24 1600   Periwound Protectant Moisture Barrier 08/21/24 1600   Dressing Changed New 08/21/24 1600   Dressing Cleansing/Solutions Not Applicable 08/21/24 1600   Dressing Options Hydrofiber Silver;Absorbent Abdominal Pad;Dry Roll Gauze;Hypafix Tape;Tubigrip 08/21/24 1600   Dressing Change/Treatment Frequency Every 72 hrs, and As Needed 08/21/24 1600   Wound Team Following Weekly 08/21/24 1600   Wound Length (cm) 16.2 cm 08/21/24 1600   Wound Width (cm) 5.5 cm 08/21/24 1600   Wound Depth (cm) 0.3 cm 08/21/24 1600   Wound Surface Area (cm^2) 89.1 cm^2 08/21/24 1600   Wound Volume  (cm^3) 26.73 cm^3 08/21/24 1600   Post-Procedure Length (cm) 16.2 cm 08/21/24 1600   Post-Procedure Width (cm) 5.5 cm 08/21/24 1600   Post-Procedure Depth (cm) 0.3 cm 08/21/24 1600   Post-Procedure Surface Area (cm^2) 89.1 cm^2 08/21/24 1600   Post-Procedure Volume (cm^3) 26.73 cm^3 08/21/24 1600   Tunneling (cm) 0 cm 08/21/24 1600   Undermining (cm) 0 cm 08/21/24 1600   Wound Odor None 08/21/24 1600   Exposed Structures None 08/21/24 1600       Wound 08/21/24 Pretibial Proximal;Lateral Left left lateral lower extremity (Active)   Wound Image   08/21/24 1600   Site Assessment Red 08/21/24 1600   Periwound Assessment Dry;Intact;Edema;Scar tissue 08/21/24 1600   Margins Epibole (rolled edges);Unattached edges 08/21/24 1600   Closure Secondary intention 08/21/24 1600   Drainage Amount Moderate 08/21/24 1600   Drainage Description Serosanguineous 08/21/24 1600   Treatments Cleansed;Topical Lidocaine;CSWD - Conservative Sharp Wound Debridement;Site care 08/21/24 1600   Wound Cleansing Hypochlorus Acid 08/21/24 1600   Periwound Protectant Moisture Barrier 08/21/24 1600   Dressing Changed New 08/21/24 1600   Dressing Cleansing/Solutions Not Applicable 08/21/24 1600   Dressing Options Hydrofiber Silver;Nonadhesive Foam;Dry Roll Gauze;Hypafix Tape;Tubigrip 08/21/24 1600   Dressing Change/Treatment Frequency Every 72 hrs, and As Needed 08/21/24 1600   Wound Team Following Weekly 08/21/24 1600   Wound Length (cm) 1.2 cm 08/21/24 1600   Wound Width (cm) 1.1 cm 08/21/24 1600   Wound Depth (cm) 0.2 cm 08/21/24 1600   Wound Surface Area (cm^2) 1.32 cm^2 08/21/24 1600   Wound Volume (cm^3) 0.264 cm^3 08/21/24 1600   Post-Procedure Length (cm) 1.2 cm 08/21/24 1600   Post-Procedure Width (cm) 1.1 cm 08/21/24 1600   Post-Procedure Depth (cm) 0.2 cm 08/21/24 1600   Post-Procedure Surface Area (cm^2) 1.32 cm^2 08/21/24 1600   Post-Procedure Volume (cm^3) 0.264 cm^3 08/21/24 1600   Tunneling (cm) 0 cm 08/21/24 1600   Undermining (cm) 0.1  cm 08/21/24 1600   Undermining of Wound, 1st Location From 11 o'clock;To 5 o'clock 08/21/24 1600   Wound Odor None 08/21/24 1600   Exposed Structures None 08/21/24 1600     Procedures:    2% viscous lidocaine applied topically to wound bed for approximately 5 minutes prior to debridement. Conservative sharp wound debridement with curette to debride wound bed and periwound of non-viable tissue. Entire surface of wound, < 20 cm2 debrided. Refer to flow sheet for wound care details. Patient tolerated the procedure well.

## 2024-08-23 ENCOUNTER — APPOINTMENT (OUTPATIENT)
Dept: WOUND CARE | Facility: MEDICAL CENTER | Age: 64
End: 2024-08-23
Attending: FAMILY MEDICINE
Payer: MEDICAID

## 2024-09-06 ENCOUNTER — NON-PROVIDER VISIT (OUTPATIENT)
Dept: WOUND CARE | Facility: MEDICAL CENTER | Age: 64
End: 2024-09-06
Attending: FAMILY MEDICINE
Payer: MEDICAID

## 2024-09-06 PROCEDURE — 97597 DBRDMT OPN WND 1ST 20 CM/<: CPT

## 2024-09-06 NOTE — PATIENT INSTRUCTIONS
Apply Aquacel Ag to wound bed as directed by provider. Cover wound with abd pad or foam and change dressing every ~3 days. Secure dressing with dry rolled gauze and tape then tubigrip.    Should you experience any significant changes in your wound(s), such as infection (redness, swelling, localized heat, increased pain, fever > 101 F, chills) or have any questions regarding your home care instructions, please contact the wound center at [phone]. If after hours, contact your primary care physician or go to the hospital emergency room.   Keep dressing clean, dry and covered while bathing. Only change dressing if it becomes over saturated, soiled or falls off.

## 2024-09-20 ENCOUNTER — APPOINTMENT (OUTPATIENT)
Dept: WOUND CARE | Facility: MEDICAL CENTER | Age: 64
End: 2024-09-20
Attending: FAMILY MEDICINE
Payer: MEDICAID

## 2024-09-27 ENCOUNTER — APPOINTMENT (OUTPATIENT)
Dept: WOUND CARE | Facility: MEDICAL CENTER | Age: 64
End: 2024-09-27
Attending: FAMILY MEDICINE
Payer: MEDICAID

## 2024-10-04 ENCOUNTER — APPOINTMENT (OUTPATIENT)
Dept: WOUND CARE | Facility: MEDICAL CENTER | Age: 64
End: 2024-10-04
Attending: FAMILY MEDICINE
Payer: MEDICAID

## 2024-10-14 ENCOUNTER — TELEPHONE (OUTPATIENT)
Dept: HEALTH INFORMATION MANAGEMENT | Facility: OTHER | Age: 64
End: 2024-10-14
Payer: MEDICAID

## 2025-01-16 ENCOUNTER — HOSPITAL ENCOUNTER (OUTPATIENT)
Dept: RADIOLOGY | Facility: MEDICAL CENTER | Age: 65
End: 2025-01-16
Payer: MEDICAID